# Patient Record
Sex: FEMALE | Race: BLACK OR AFRICAN AMERICAN | NOT HISPANIC OR LATINO | Employment: PART TIME | URBAN - METROPOLITAN AREA
[De-identification: names, ages, dates, MRNs, and addresses within clinical notes are randomized per-mention and may not be internally consistent; named-entity substitution may affect disease eponyms.]

---

## 2017-01-02 ENCOUNTER — HOSPITAL ENCOUNTER (OUTPATIENT)
Dept: OBSTETRICS AND GYNECOLOGY | Facility: HOSPITAL | Age: 27
End: 2017-01-02
Attending: OBSTETRICS & GYNECOLOGY | Admitting: OBSTETRICS & GYNECOLOGY

## 2017-02-16 ENCOUNTER — HISTORICAL (OUTPATIENT)
Dept: LAB | Facility: HOSPITAL | Age: 27
End: 2017-02-16

## 2019-06-12 ENCOUNTER — HISTORICAL (OUTPATIENT)
Dept: LAB | Facility: HOSPITAL | Age: 29
End: 2019-06-12

## 2019-08-05 ENCOUNTER — HISTORICAL (OUTPATIENT)
Dept: LAB | Facility: HOSPITAL | Age: 29
End: 2019-08-05

## 2019-10-01 ENCOUNTER — HISTORICAL (OUTPATIENT)
Dept: ADMINISTRATIVE | Facility: HOSPITAL | Age: 29
End: 2019-10-01

## 2019-10-02 ENCOUNTER — HISTORICAL (OUTPATIENT)
Dept: ADMINISTRATIVE | Facility: HOSPITAL | Age: 29
End: 2019-10-02

## 2019-12-23 ENCOUNTER — HISTORICAL (OUTPATIENT)
Dept: LAB | Facility: HOSPITAL | Age: 29
End: 2019-12-23

## 2019-12-24 ENCOUNTER — HISTORICAL (OUTPATIENT)
Dept: ADMINISTRATIVE | Facility: HOSPITAL | Age: 29
End: 2019-12-24

## 2019-12-25 LAB — FINAL CULTURE: NORMAL

## 2022-04-09 ENCOUNTER — HISTORICAL (OUTPATIENT)
Dept: ADMINISTRATIVE | Facility: HOSPITAL | Age: 32
End: 2022-04-09

## 2022-04-26 VITALS
SYSTOLIC BLOOD PRESSURE: 128 MMHG | HEIGHT: 72 IN | DIASTOLIC BLOOD PRESSURE: 70 MMHG | BODY MASS INDEX: 33.86 KG/M2 | WEIGHT: 250 LBS

## 2022-06-15 ENCOUNTER — LAB VISIT (OUTPATIENT)
Dept: LAB | Facility: HOSPITAL | Age: 32
End: 2022-06-15
Attending: OBSTETRICS & GYNECOLOGY
Payer: MEDICAID

## 2022-06-15 DIAGNOSIS — N91.2 AMENORRHEA: ICD-10-CM

## 2022-06-15 LAB — B-HCG FREE SERPL-ACNC: ABNORMAL MIU/ML

## 2022-06-15 PROCEDURE — 36415 COLL VENOUS BLD VENIPUNCTURE: CPT

## 2022-06-15 PROCEDURE — 81025 URINE PREGNANCY TEST: CPT

## 2022-06-27 ENCOUNTER — LAB VISIT (OUTPATIENT)
Dept: LAB | Facility: HOSPITAL | Age: 32
End: 2022-06-27
Attending: OBSTETRICS & GYNECOLOGY
Payer: MEDICAID

## 2022-06-27 DIAGNOSIS — O20.0 THREATENED MISCARRIAGE IN EARLY PREGNANCY: ICD-10-CM

## 2022-06-27 LAB — B-HCG FREE SERPL-ACNC: ABNORMAL MIU/ML

## 2022-06-27 PROCEDURE — 81025 URINE PREGNANCY TEST: CPT

## 2022-06-27 PROCEDURE — 36415 COLL VENOUS BLD VENIPUNCTURE: CPT

## 2022-06-28 ENCOUNTER — HOSPITAL ENCOUNTER (OUTPATIENT)
Dept: RADIOLOGY | Facility: HOSPITAL | Age: 32
Discharge: HOME | End: 2022-06-28
Attending: OBSTETRICS & GYNECOLOGY
Payer: MEDICAID

## 2022-06-28 DIAGNOSIS — N91.2 AMENORRHEA: ICD-10-CM

## 2022-06-28 DIAGNOSIS — Z34.80 PREGNANCY IN MULTIGRAVIDA: ICD-10-CM

## 2022-06-28 PROCEDURE — 76801 OB US < 14 WKS SINGLE FETUS: CPT | Mod: TC

## 2022-07-11 ENCOUNTER — HOSPITAL ENCOUNTER (EMERGENCY)
Facility: HOSPITAL | Age: 32
Discharge: HOME | End: 2022-07-11
Attending: EMERGENCY MEDICINE
Payer: MEDICAID

## 2022-07-11 VITALS
BODY MASS INDEX: 33.23 KG/M2 | HEIGHT: 72 IN | HEART RATE: 83 BPM | RESPIRATION RATE: 18 BRPM | DIASTOLIC BLOOD PRESSURE: 71 MMHG | OXYGEN SATURATION: 99 % | TEMPERATURE: 99 F | WEIGHT: 245.38 LBS | SYSTOLIC BLOOD PRESSURE: 143 MMHG

## 2022-07-11 DIAGNOSIS — O03.9 MISCARRIAGE: Primary | ICD-10-CM

## 2022-07-11 LAB
ALBUMIN SERPL-MCNC: 3.7 GM/DL (ref 3.5–5)
ALBUMIN/GLOB SERPL: 1 RATIO (ref 1.1–2)
ALP SERPL-CCNC: 70 UNIT/L (ref 40–150)
ALT SERPL-CCNC: 11 UNIT/L (ref 0–55)
APPEARANCE UR: ABNORMAL
AST SERPL-CCNC: 16 UNIT/L (ref 5–34)
B-HCG FREE SERPL-ACNC: 7238.2 MIU/ML
BACTERIA #/AREA URNS AUTO: ABNORMAL /HPF
BASOPHILS # BLD AUTO: 0.03 X10(3)/MCL (ref 0–0.2)
BASOPHILS NFR BLD AUTO: 0.5 %
BILIRUB UR QL STRIP.AUTO: ABNORMAL
BILIRUBIN DIRECT+TOT PNL SERPL-MCNC: 0.5 MG/DL
BUN SERPL-MCNC: 7.2 MG/DL (ref 7–18.7)
CALCIUM SERPL-MCNC: 9.2 MG/DL (ref 8.4–10.2)
CHLORIDE SERPL-SCNC: 106 MMOL/L (ref 98–107)
CO2 SERPL-SCNC: 21 MMOL/L (ref 22–29)
COLOR UR AUTO: ABNORMAL
CREAT SERPL-MCNC: 0.68 MG/DL (ref 0.55–1.02)
EOSINOPHIL # BLD AUTO: 0.15 X10(3)/MCL (ref 0–0.9)
EOSINOPHIL NFR BLD AUTO: 2.3 %
ERYTHROCYTE [DISTWIDTH] IN BLOOD BY AUTOMATED COUNT: 13.6 % (ref 11.5–17)
GLOBULIN SER-MCNC: 3.7 GM/DL (ref 2.4–3.5)
GLUCOSE SERPL-MCNC: 113 MG/DL (ref 74–100)
GLUCOSE UR QL STRIP.AUTO: ABNORMAL
GROUP & RH: NORMAL
HCT VFR BLD AUTO: 36.2 % (ref 37–47)
HGB BLD-MCNC: 11.8 GM/DL (ref 12–16)
IMM GRANULOCYTES # BLD AUTO: 0.02 X10(3)/MCL (ref 0–0.04)
IMM GRANULOCYTES NFR BLD AUTO: 0.3 %
KETONES UR QL STRIP.AUTO: ABNORMAL
LEUKOCYTE ESTERASE UR QL STRIP.AUTO: ABNORMAL
LYMPHOCYTES # BLD AUTO: 1.73 X10(3)/MCL (ref 0.6–4.6)
LYMPHOCYTES NFR BLD AUTO: 26.3 %
MCH RBC QN AUTO: 27.1 PG (ref 27–31)
MCHC RBC AUTO-ENTMCNC: 32.6 MG/DL (ref 33–36)
MCV RBC AUTO: 83 FL (ref 80–94)
MONOCYTES # BLD AUTO: 0.52 X10(3)/MCL (ref 0.1–1.3)
MONOCYTES NFR BLD AUTO: 7.9 %
NEUTROPHILS # BLD AUTO: 4.1 X10(3)/MCL (ref 2.1–9.2)
NEUTROPHILS NFR BLD AUTO: 62.7 %
NITRITE UR QL STRIP.AUTO: ABNORMAL
NRBC BLD AUTO-RTO: 0 %
PH UR STRIP.AUTO: ABNORMAL [PH]
PLATELET # BLD AUTO: 323 X10(3)/MCL (ref 130–400)
PMV BLD AUTO: 9.3 FL (ref 7.4–10.4)
POTASSIUM SERPL-SCNC: 3.8 MMOL/L (ref 3.5–5.1)
PROT SERPL-MCNC: 7.4 GM/DL (ref 6.4–8.3)
PROT UR QL STRIP.AUTO: ABNORMAL
RBC # BLD AUTO: 4.36 X10(6)/MCL (ref 4.2–5.4)
RBC #/AREA URNS AUTO: >=100 /HPF
RBC UR QL AUTO: ABNORMAL
SODIUM SERPL-SCNC: 136 MMOL/L (ref 136–145)
SP GR UR STRIP.AUTO: ABNORMAL
SQUAMOUS #/AREA URNS AUTO: ABNORMAL /HPF
UROBILINOGEN UR STRIP-ACNC: ABNORMAL
WBC # SPEC AUTO: 6.6 X10(3)/MCL (ref 4.5–11.5)
WBC #/AREA URNS AUTO: ABNORMAL /HPF

## 2022-07-11 PROCEDURE — 36415 COLL VENOUS BLD VENIPUNCTURE: CPT | Performed by: EMERGENCY MEDICINE

## 2022-07-11 PROCEDURE — 81025 URINE PREGNANCY TEST: CPT | Performed by: EMERGENCY MEDICINE

## 2022-07-11 PROCEDURE — 86901 BLOOD TYPING SEROLOGIC RH(D): CPT | Performed by: EMERGENCY MEDICINE

## 2022-07-11 PROCEDURE — 80053 COMPREHEN METABOLIC PANEL: CPT | Performed by: EMERGENCY MEDICINE

## 2022-07-11 PROCEDURE — 85025 COMPLETE CBC W/AUTO DIFF WBC: CPT | Performed by: EMERGENCY MEDICINE

## 2022-07-11 PROCEDURE — 81001 URINALYSIS AUTO W/SCOPE: CPT | Performed by: EMERGENCY MEDICINE

## 2022-07-11 PROCEDURE — 99284 EMERGENCY DEPT VISIT MOD MDM: CPT | Mod: 25

## 2022-07-11 RX ORDER — METHYLERGONOVINE MALEATE 0.2 MG/1
0.2 TABLET ORAL 4 TIMES DAILY
Qty: 8 TABLET | Refills: 0 | Status: SHIPPED | OUTPATIENT
Start: 2022-07-11 | End: 2022-07-13

## 2022-07-11 NOTE — ED PROVIDER NOTES
Encounter Date: 7/11/2022    SCRIBE #1 NOTE: I, Vikki Hernandez, am scribing for, and in the presence of,  Sriram Cornejo. I have scribed the following portions of the note - Other sections scribed: HPI, ROS, physical exam.       History     Chief Complaint   Patient presents with    Vaginal Bleeding     C/o vaginal bleeding and lower abdominal pain starting this morning. Pt states passing clots. Pt states 7 weeks pregnant. Denies N/V.      32 y/o female presents to the ED with complaints of abdominal cramping and vaginal bleeding onset 3 hours ago. Pt reports that she is 7 weeks pregnant and that this is her sixth pregnancy with one miscarriage in the past. She notes that there is lots of clotting.     OBGYN: Gentry Tan MD      Vaginal Bleeding  This is a new problem. The current episode started 3 to 5 hours ago. The problem occurs rarely. The problem has not changed since onset.Associated symptoms include abdominal pain. Pertinent negatives include no chest pain and no shortness of breath. Nothing aggravates the symptoms. Nothing relieves the symptoms. She has tried nothing for the symptoms.     Review of patient's allergies indicates:   Allergen Reactions    Amoxicillin Hives     History reviewed. No pertinent past medical history.  History reviewed. No pertinent surgical history.  Family History   Problem Relation Age of Onset    Hypertension Mother     Diabetes Mother     Cataracts Mother     Colon cancer Mother      Social History     Tobacco Use    Smoking status: Never Smoker    Smokeless tobacco: Never Used     Review of Systems   Constitutional: Negative for fatigue, fever and unexpected weight change.   HENT: Negative for congestion and rhinorrhea.    Eyes: Negative for pain.   Respiratory: Negative for chest tightness, shortness of breath and wheezing.    Cardiovascular: Negative for chest pain.   Gastrointestinal: Positive for abdominal pain. Negative for constipation, diarrhea, nausea  and vomiting.   Genitourinary: Positive for vaginal bleeding. Negative for dysuria.   Musculoskeletal: Negative for back pain and neck pain.   Skin: Negative for rash.   Allergic/Immunologic: Negative for environmental allergies, food allergies and immunocompromised state.   Neurological: Negative for dizziness and speech difficulty.   Hematological: Does not bruise/bleed easily.   Psychiatric/Behavioral: Negative for sleep disturbance and suicidal ideas.       Physical Exam     Initial Vitals [07/11/22 0518]   BP Pulse Resp Temp SpO2   (!) 150/88 99 20 98.8 °F (37.1 °C) 100 %      MAP       --         Physical Exam    Nursing note and vitals reviewed.  Constitutional: She appears well-developed and well-nourished.   HENT:   Head: Normocephalic and atraumatic.   Right Ear: External ear normal.   Left Ear: External ear normal.   Eyes: Conjunctivae and EOM are normal. Pupils are equal, round, and reactive to light.   Neck: Neck supple.   Normal range of motion.  Cardiovascular: Normal rate, regular rhythm, normal heart sounds and intact distal pulses.   Pulmonary/Chest: Breath sounds normal.   Abdominal: Abdomen is soft. Bowel sounds are normal.   Musculoskeletal:         General: Normal range of motion.      Cervical back: Normal range of motion and neck supple.     Neurological: She is alert and oriented to person, place, and time. GCS score is 15. GCS eye subscore is 4. GCS verbal subscore is 5. GCS motor subscore is 6.   Skin: Skin is warm and dry. Capillary refill takes less than 2 seconds.   Psychiatric: She has a normal mood and affect. Her behavior is normal. Judgment and thought content normal.         ED Course   Procedures  Labs Reviewed   URINALYSIS, REFLEX TO URINE CULTURE - Abnormal; Notable for the following components:       Result Value    Color, UA Red (*)     Appearance, UA Bloody (*)     All other components within normal limits    Narrative:     Unable to perform biochemical testing on urine due  to color.    HCG, QUANTITATIVE - Abnormal; Notable for the following components:    Beta Human Chorionic Gonadotropin Quantitative 7,238.20 (*)     All other components within normal limits   COMPREHENSIVE METABOLIC PANEL - Abnormal; Notable for the following components:    Carbon Dioxide 21 (*)     Glucose Level 113 (*)     Globulin 3.7 (*)     Albumin/Globulin Ratio 1.0 (*)     All other components within normal limits   CBC WITH DIFFERENTIAL - Abnormal; Notable for the following components:    Hgb 11.8 (*)     Hct 36.2 (*)     MCHC 32.6 (*)     All other components within normal limits   URINALYSIS, MICROSCOPIC - Abnormal; Notable for the following components:    RBC, UA >=100 (*)     All other components within normal limits   CBC W/ AUTO DIFFERENTIAL    Narrative:     The following orders were created for panel order CBC auto differential.  Procedure                               Abnormality         Status                     ---------                               -----------         ------                     CBC with Differential[[id number]]        Abnormal            Final result                 Please view results for these tests on the individual orders.   GROUP & RH          Imaging Results          US OB <14 Wks, TransAbd, Single Gestation (Final result)  Result time 07/11/22 08:51:32    Final result by Nani Hogan MD (07/11/22 08:51:32)                 Impression:      1. No intrauterine pregnancy identified.  2. Vascular material within the endometrial canal may represent retained products of conception.      Electronically signed by: Nani Hogan  Date:    07/11/2022  Time:    08:51             Narrative:    EXAMINATION:  US OB <14 WEEKS TRANSABDOM, SINGLE GESTATION    CLINICAL HISTORY:  vaginal bleeding;    TECHNIQUE:  Transabdominal ultrasound of the pelvis.    COMPARISON:  Ultrasound dated 06/28/2022    FINDINGS:  The uterus measures 12.1 x 6.1 x 6.4 cm in size.  The endometrium  measures 3 mm in thickness.  There is no intrauterine pregnancy identified.  Material seen within the endometrial canal measures 3 x 1.8 cm in size with internal vascularity.    The right ovary is not visualized.  Left ovary measures 3 x 2.6 x 2.1 cm in size.    There is no adnexal mass or free fluid.                                 Medications - No data to display             Attending Attestation:           Physician Attestation for Scribe:  Physician Attestation Statement for Scribe #1: I, Sriram Cornejo, reviewed documentation, as scribed by Vikki Hernandez in my presence, and it is both accurate and complete.                      Clinical Impression:   Final diagnoses:  [O03.9] Miscarriage (Primary)          ED Disposition Condition    Discharge Stable        ED Prescriptions     Medication Sig Dispense Start Date End Date Auth. Provider    methylergonovine (METHERGINE) 0.2 mg tablet Take 1 tablet (0.2 mg total) by mouth 4 (four) times daily. for 2 days 8 tablet 7/11/2022 7/13/2022 Sriram Cornejo MD        Follow-up Information     Follow up With Specialties Details Why Contact Info    Follow up with your primary MD in 3-5 days if not improved.  Return to ED for worsening symptoms.               Sriram Cornejo MD  07/11/22 0901

## 2022-07-11 NOTE — Clinical Note
"Anna Russell" Abdirashid was seen and treated in our emergency department on 7/11/2022.  She may return to work on 07/14/2022.       If you have any questions or concerns, please don't hesitate to call.       RN    "

## 2022-07-21 ENCOUNTER — LAB VISIT (OUTPATIENT)
Dept: LAB | Facility: HOSPITAL | Age: 32
End: 2022-07-21
Attending: OBSTETRICS & GYNECOLOGY
Payer: MEDICAID

## 2022-07-21 DIAGNOSIS — O03.4 INCOMPLETE ABORTION: ICD-10-CM

## 2022-07-21 LAB — B-HCG FREE SERPL-ACNC: 66.34 MIU/ML

## 2022-07-21 PROCEDURE — 36415 COLL VENOUS BLD VENIPUNCTURE: CPT

## 2022-07-21 PROCEDURE — 84702 CHORIONIC GONADOTROPIN TEST: CPT

## 2022-12-09 ENCOUNTER — LAB VISIT (OUTPATIENT)
Dept: LAB | Facility: HOSPITAL | Age: 32
End: 2022-12-09
Attending: OBSTETRICS & GYNECOLOGY
Payer: MEDICAID

## 2022-12-09 DIAGNOSIS — Z03.89 ENCNTR FOR OBS FOR OTH SUSPECTED DISEASES AND COND RULED OUT: ICD-10-CM

## 2022-12-09 DIAGNOSIS — Z34.81 PRENATAL CARE, SUBSEQUENT PREGNANCY, FIRST TRIMESTER: ICD-10-CM

## 2022-12-09 DIAGNOSIS — N91.2 AMENORRHEA: ICD-10-CM

## 2022-12-09 LAB
AMPHET UR QL SCN: NEGATIVE
BARBITURATE SCN PRESENT UR: NEGATIVE
BASOPHILS # BLD AUTO: 0.01 X10(3)/MCL (ref 0–0.2)
BASOPHILS NFR BLD AUTO: 0.2 %
BENZODIAZ UR QL SCN: NEGATIVE
CANNABINOIDS UR QL SCN: NEGATIVE
COCAINE UR QL SCN: NEGATIVE
EOSINOPHIL # BLD AUTO: 0.07 X10(3)/MCL (ref 0–0.9)
EOSINOPHIL NFR BLD AUTO: 1.1 %
ERYTHROCYTE [DISTWIDTH] IN BLOOD BY AUTOMATED COUNT: 15.3 % (ref 11.5–17)
GROUP & RH: NORMAL
HBV SURFACE AG SERPL QL IA: NONREACTIVE
HCT VFR BLD AUTO: 38.4 % (ref 37–47)
HCV AB SERPL QL IA: NONREACTIVE
HGB BLD-MCNC: 11.7 GM/DL (ref 12–16)
HIV 1+2 AB+HIV1 P24 AG SERPL QL IA: NONREACTIVE
IMM GRANULOCYTES # BLD AUTO: 0.01 X10(3)/MCL (ref 0–0.04)
IMM GRANULOCYTES NFR BLD AUTO: 0.2 %
INDIRECT COOMBS GEL: NORMAL
LYMPHOCYTES # BLD AUTO: 1.62 X10(3)/MCL (ref 0.6–4.6)
LYMPHOCYTES NFR BLD AUTO: 24.8 %
MCH RBC QN AUTO: 24.7 PG (ref 27–31)
MCHC RBC AUTO-ENTMCNC: 30.5 MG/DL (ref 33–36)
MCV RBC AUTO: 81 FL (ref 80–94)
MONOCYTES # BLD AUTO: 0.4 X10(3)/MCL (ref 0.1–1.3)
MONOCYTES NFR BLD AUTO: 6.1 %
NEUTROPHILS # BLD AUTO: 4.4 X10(3)/MCL (ref 2.1–9.2)
NEUTROPHILS NFR BLD AUTO: 67.6 %
OPIATES UR QL SCN: NEGATIVE
PCP UR QL: NEGATIVE
PH UR: 7 [PH] (ref 3–11)
PLATELET # BLD AUTO: 342 X10(3)/MCL (ref 130–400)
PMV BLD AUTO: 9.1 FL (ref 7.4–10.4)
RBC # BLD AUTO: 4.74 X10(6)/MCL (ref 4.2–5.4)
RUBELLA IMMUNE STATUS: NORMAL
SPECIFIC GRAVITY, URINE AUTO (.000) (OHS): 1.02 (ref 1–1.03)
T PALLIDUM AB SER QL: NONREACTIVE
T4 FREE SERPL-MCNC: 0.8 NG/DL (ref 0.7–1.48)
TSH SERPL-ACNC: 1.56 UIU/ML (ref 0.35–4.94)
WBC # SPEC AUTO: 6.5 X10(3)/MCL (ref 4.5–11.5)

## 2022-12-09 PROCEDURE — 85025 COMPLETE CBC W/AUTO DIFF WBC: CPT

## 2022-12-09 PROCEDURE — 87340 HEPATITIS B SURFACE AG IA: CPT

## 2022-12-09 PROCEDURE — 86850 RBC ANTIBODY SCREEN: CPT | Performed by: OBSTETRICS & GYNECOLOGY

## 2022-12-09 PROCEDURE — 84443 ASSAY THYROID STIM HORMONE: CPT

## 2022-12-09 PROCEDURE — 86787 VARICELLA-ZOSTER ANTIBODY: CPT

## 2022-12-09 PROCEDURE — 84439 ASSAY OF FREE THYROXINE: CPT

## 2022-12-09 PROCEDURE — 86780 TREPONEMA PALLIDUM: CPT

## 2022-12-09 PROCEDURE — 36415 COLL VENOUS BLD VENIPUNCTURE: CPT

## 2022-12-09 PROCEDURE — 87389 HIV-1 AG W/HIV-1&-2 AB AG IA: CPT

## 2022-12-09 PROCEDURE — 83021 HEMOGLOBIN CHROMOTOGRAPHY: CPT

## 2022-12-09 PROCEDURE — 82664 ELECTROPHORETIC TEST: CPT

## 2022-12-09 PROCEDURE — 86803 HEPATITIS C AB TEST: CPT

## 2022-12-09 PROCEDURE — 86762 RUBELLA ANTIBODY: CPT

## 2022-12-09 PROCEDURE — 80307 DRUG TEST PRSMV CHEM ANLYZR: CPT

## 2022-12-10 LAB
RUBV IGG SERPL IA-ACNC: 1
RUBV IGG SERPL QL IA: POSITIVE
VZV IGG SER IA-ACNC: 1
VZV IGG SER QL IA: NORMAL

## 2022-12-12 LAB
HGB A MFR BLD ELPH: 97.4 % (ref 95.8–98)
HGB A2 MFR BLD: 2.6 % (ref 2–3.3)
HGB F MFR BLD: 0 % (ref 0–0.9)
HGB FRACT BLD ELPH-IMP: NORMAL
M HPLC HB VARIANT, B: NORMAL

## 2023-03-03 ENCOUNTER — LAB VISIT (OUTPATIENT)
Dept: LAB | Facility: HOSPITAL | Age: 33
End: 2023-03-03
Attending: OBSTETRICS & GYNECOLOGY
Payer: MEDICAID

## 2023-03-03 DIAGNOSIS — Z34.82 SUPERVISION OF NORMAL INTRAUTERINE PREGNANCY IN MULTIGRAVIDA, SECOND TRIMESTER: ICD-10-CM

## 2023-03-03 PROCEDURE — 36415 COLL VENOUS BLD VENIPUNCTURE: CPT

## 2023-03-03 PROCEDURE — 81511 FTL CGEN ABNOR FOUR ANAL: CPT

## 2023-03-06 LAB
# FETUSES: NORMAL
2ND TRIMESTER 4 SCREEN SERPL-IMP: NORMAL
AFP ADJ MOM SERPL: 0.67 MOM
AFP SERPL IA-MCNC: 28.9 NG/ML
AGE AT DELIVERY: NORMAL
B-HCG ADJ MOM SERPL: 1.21 MOM
CHORION TYPE: NORMAL
COLLECT DATE: NORMAL
CURRENT SMOKER: NORMAL
FET TS 21 RISK FROM MAT AGE: NORMAL
GA EST FROM LMP: NORMAL WK,D
GA METHOD: NORMAL
HCG SERPL IA-ACNC: 20.4 IU/ML
HX OF NTD QL: NO
HX OF NTD QL: NO
HX OF TRISOMY 21 QL: NO
IDDM PATIENT QL: NO
INHIBIN A ADJ MOM SERPL: 1.06 MOM
INHIBIN SERPL-MCNC: 118 PG/ML
IVF PREGNANCY: NO
LABORATORY COMMENT REPORT: NORMAL
M PHYSICIAN PHONE NUMBER: NORMAL
MATERNAL RISK FACTORS: NORMAL
NEURAL TUBE DEFECT RISK FETUS: NORMAL %
RECOM F/U: NORMAL
TEST PERFORMANCE INFO SPEC: NORMAL
TS 18 RISK FETUS: NORMAL
TS 21 RISK FETUS: NORMAL
U ESTRIOL ADJ MOM SERPL: 0.66 MOM
U ESTRIOL SERPL-MCNC: 1.05 NG/ML

## 2023-03-10 ENCOUNTER — LAB VISIT (OUTPATIENT)
Dept: LAB | Facility: HOSPITAL | Age: 33
End: 2023-03-10
Attending: OBSTETRICS & GYNECOLOGY
Payer: MEDICAID

## 2023-03-10 DIAGNOSIS — O09.292 HX OF MATERNAL LACERATION, 3RD DEGREE, CURRENTLY PREGNANT, SECOND TRIMESTER: ICD-10-CM

## 2023-03-10 DIAGNOSIS — Z3A.19 19 WEEKS GESTATION OF PREGNANCY: Primary | ICD-10-CM

## 2023-03-10 DIAGNOSIS — O09.92 SUPERVISION OF HIGH RISK PREGNANCY IN SECOND TRIMESTER: ICD-10-CM

## 2023-03-10 DIAGNOSIS — O10.012 PRE-EXISTING ESSENTIAL HYPERTENSION COMPLICATING PREGNANCY IN SECOND TRIMESTER: ICD-10-CM

## 2023-03-10 DIAGNOSIS — O99.212 OBESITY COMPLICATING PREGNANCY IN SECOND TRIMESTER: ICD-10-CM

## 2023-03-10 DIAGNOSIS — O09.212 SUPERVISION OF PREGNANCY WITH HISTORY OF PRE-TERM LABOR IN SECOND TRIMESTER: ICD-10-CM

## 2023-03-10 LAB
CREAT CL 24H UR+SERPL-VRATE: 132.3 ML/MIN (ref 70–157)
CREAT SERPL-MCNC: 0.66 MG/DL (ref 0.55–1.02)
CREAT SERPL-MCNC: 0.66 MG/DL (ref 0.55–1.02)
CREAT UR-MCNC: 49.3 MG/DL (ref 47–110)
GFR SERPLBLD CREATININE-BSD FMLA CKD-EPI: >60 MLS/MIN/1.73/M2
GLUCOSE 1H P 100 G GLC PO SERPL-MCNC: 227 MG/DL (ref 74–100)
HRS COLLECTED CREATININE (OHS): 24 HR
PROT 24H UR-MCNC: NORMAL G/DL
PROT UR STRIP-MCNC: <6.8 MG/DL
TOTAL VOLUME  (OHS): 2550 ML
TOTAL VOLUME  (OHS): 2550 ML

## 2023-03-10 PROCEDURE — 82565 ASSAY OF CREATININE: CPT

## 2023-03-10 PROCEDURE — 82950 GLUCOSE TEST: CPT

## 2023-03-10 PROCEDURE — 36415 COLL VENOUS BLD VENIPUNCTURE: CPT

## 2023-03-10 PROCEDURE — 84156 ASSAY OF PROTEIN URINE: CPT

## 2023-05-25 DIAGNOSIS — O10.919 HTN IN PREGNANCY, CHRONIC: ICD-10-CM

## 2023-05-25 DIAGNOSIS — O09.893 H/O PRETERM DELIVERY, CURRENTLY PREGNANT, THIRD TRIMESTER: ICD-10-CM

## 2023-05-25 DIAGNOSIS — O24.414 INSULIN CONTROLLED GESTATIONAL DIABETES MELLITUS (GDM) IN THIRD TRIMESTER: ICD-10-CM

## 2023-05-25 NOTE — PROGRESS NOTES
23    32-year-old female  at 30 2/7 weeks gestation with EDC of 2023 by early ultrasound and unknown LMP. Her chronic medical, obstetrical or fetal conditions that affect the pregnancy include the following: Patient has history of PTD at 36 weeks in 2017 pregnancy, confirmed by delivery record. She is on weekly Mayetta and had cervical insufficiency ruled out. She decided to continue Mayetta as long as supplies last, after counseling after FDA position on Lisa. She had elevated BMI greater than 35 on consult visit. She has recurrent gestational diabetes. She is currently on 52 units NPH at bedtime. She brought log for review.She has mild underlying CHTN. She is on low dose aspirin twice daily.On 3-10-23 24 hour urine protein unable to calculate, creatinine 0.66. She is grandmultiparous, denied any issues with heavy postpartum bleeding or requiring blood transfusion. She reports good fetal movement. She voices no complaints and denies any abdominal cramping, leaking of fluid, vaginal bleeding or vaginal discharge. Patient also reports no headaches, visual problems or epigastric pain.  Diagnosis & Plan  1) Z3A.30 Weeks of gestation; 30 weeks gestation of pregnancy  2) O24.414 Diabetes mellitus in pregnancy, childbirth, and the puerperium; Gestational diabetes mellitus in pregnancy, insulin controlled  3) O10.013 Pre-existing hypertension complicating pregnancy, childbirth and the puerperium; Pre-existing essential hypertension complicating pregnancy, third trimester  4) O09.213 Supervision of high risk pregnancy; Supervision of pregnancy with history of pre-term labor, third trimester  5) E66.01 Overweight and obesity; Morbid (severe) obesity due to excess calories  General Plan  30 2/7 weeks gestation with    1. Previous  delivery, with upper normal fetal growth (1269 g at 69%, AC at 83% on 23), with normal FHR per Doppler    She has decided to continue Mayetta until she runs out or  until 36 weeks, and she has elected to avoid vaginal progesterone if she runs out of Lisa before 36 weeks.    Received Lisa injection today and will continue weekly until 36 weeks or until out of medication.  PTL precautions reviewed.    2. Mild underlying CHTN with report of elevated BP late last pregnancy  Chronic hypertension complicates up to 2-5% of pregnancies and is associated with significant adverse pregnancy outcomes including  birth, fetal growth restriction, fetal demise, placental abruption, and  delivery.    With blood pressure 129/71, she was advised to continue a low sodium diet avoiding any excessive weight gain. On 3-10-23 24 hour urine protein unable to calculate, creatinine 0.66.    Continue low dose aspirin 81mg po twice daily until 34 6/7 weeks, then adjust to once daily dosing after that until delivery. Preeclampsia precautions reviewed.    Will do follow-up ultrasounds to monitor growth every 3-4 weeks with TWICE WEEKLY fetal testing starting around 32 weeks gestation until the end of pregnancy to alternate with primary OB with added risk of GDM on insulin. Reviewed FKC 3x/day and prn with instructions to report any decreased fetal movement.    Among patients with uncomplicated chronic hypertension with no additional risk factors, delivery at 38-39 weeks appears to provide optimal balance between the risk of adverse fetal and  outcomes. However, will reassess closer to EDC to determine optimal timeframe or GA for delivery, based on current evaluation at that time.    3. Elevated BMI greater than 35, stable weight  Continue low calorie, low fat, low Na, diabetic diet avoiding any additional excessive weight gain. Excess weight gain would be associated with worsening hypertension, worsening gestational diabetes and adverse  outcomes, including fetal demise in utero.    4. Grandmultiparity  Consider using prophylactic dose of uterotonic agents after delivery  to reduce the risk of postpartum hemorrhage.    5. Recurrent gestational diabetes.  She is aware of risks associated with diabetes in pregnancy including higher risk for polyhydramnios, fetal macrosomia, lower Apgar scores and  metabolic complications (hypoglycemia, hyperbilirubinemia, hypocalcemia, erythema) and developing preeclampsia.    Continue 2200 calorie ADA diabetic diet. It is recommended that she have 30 grams of carbohydrates with breakfast, and 60 grams with lunch and dinner. In addition, she should have three snacks in between meals with 15 grams of carbohydrates and at bedtime with 30 grams of carbohydrates.    Log reviewed. Will continue 52 units NPH at bedtime. Continue to check blood glucose, and continue checking 2/day (fasting and alternate post meal checks), and call weekly with values.    The association of gestational diabetes (50%) with adult-onset type 2 diabetes mellitus was previously discussed. The importance of losing weight after the pregnancy was also previously discussed, as well as doing a 75 g sugar test after postpartum exam and to have annual checkups every 1-3 years for blood sugars to make sure she does not develop diabetes.  Return Visit:- 3 Weeks    -Reason:- MFM Follow Up BPP    next week injection with Dr Tan.

## 2023-06-01 ENCOUNTER — OFFICE VISIT (OUTPATIENT)
Dept: MATERNAL FETAL MEDICINE | Facility: CLINIC | Age: 33
End: 2023-06-01
Payer: MEDICAID

## 2023-06-01 ENCOUNTER — PROCEDURE VISIT (OUTPATIENT)
Dept: MATERNAL FETAL MEDICINE | Facility: CLINIC | Age: 33
End: 2023-06-01
Payer: MEDICAID

## 2023-06-01 VITALS
SYSTOLIC BLOOD PRESSURE: 110 MMHG | HEIGHT: 72 IN | BODY MASS INDEX: 35.08 KG/M2 | WEIGHT: 259 LBS | DIASTOLIC BLOOD PRESSURE: 70 MMHG

## 2023-06-01 DIAGNOSIS — O24.414 INSULIN CONTROLLED GESTATIONAL DIABETES MELLITUS (GDM) IN THIRD TRIMESTER: Primary | ICD-10-CM

## 2023-06-01 DIAGNOSIS — O09.893 H/O PRETERM DELIVERY, CURRENTLY PREGNANT, THIRD TRIMESTER: ICD-10-CM

## 2023-06-01 DIAGNOSIS — O99.213 OBESITY COMPLICATING PREGNANCY IN THIRD TRIMESTER: ICD-10-CM

## 2023-06-01 DIAGNOSIS — O24.414 INSULIN CONTROLLED GESTATIONAL DIABETES MELLITUS (GDM) IN THIRD TRIMESTER: ICD-10-CM

## 2023-06-01 DIAGNOSIS — O10.919 HTN IN PREGNANCY, CHRONIC: ICD-10-CM

## 2023-06-01 PROBLEM — O10.013 PRE-EXISTING ESSENTIAL HYPERTENSION COMPLICATING PREGNANCY IN THIRD TRIMESTER: Status: ACTIVE | Noted: 2023-05-25

## 2023-06-01 LAB — GLUCOSE SERPL-MCNC: 96 MG/DL (ref 70–110)

## 2023-06-01 PROCEDURE — 99214 OFFICE O/P EST MOD 30 MIN: CPT | Mod: TH,S$GLB,, | Performed by: OBSTETRICS & GYNECOLOGY

## 2023-06-01 PROCEDURE — 3008F BODY MASS INDEX DOCD: CPT | Mod: CPTII,S$GLB,, | Performed by: OBSTETRICS & GYNECOLOGY

## 2023-06-01 PROCEDURE — 99214 PR OFFICE/OUTPT VISIT, EST, LEVL IV, 30-39 MIN: ICD-10-PCS | Mod: TH,S$GLB,, | Performed by: OBSTETRICS & GYNECOLOGY

## 2023-06-01 PROCEDURE — 3074F SYST BP LT 130 MM HG: CPT | Mod: CPTII,S$GLB,, | Performed by: OBSTETRICS & GYNECOLOGY

## 2023-06-01 PROCEDURE — 76819 FETAL BIOPHYS PROFIL W/O NST: CPT | Mod: 59,95,S$GLB, | Performed by: OBSTETRICS & GYNECOLOGY

## 2023-06-01 PROCEDURE — 3078F PR MOST RECENT DIASTOLIC BLOOD PRESSURE < 80 MM HG: ICD-10-PCS | Mod: CPTII,S$GLB,, | Performed by: OBSTETRICS & GYNECOLOGY

## 2023-06-01 PROCEDURE — 82962 POCT GLUCOSE, HAND-HELD DEVICE: ICD-10-PCS | Mod: ,,, | Performed by: OBSTETRICS & GYNECOLOGY

## 2023-06-01 PROCEDURE — 76816 OB US FOLLOW-UP PER FETUS: CPT | Mod: S$GLB,,, | Performed by: OBSTETRICS & GYNECOLOGY

## 2023-06-01 PROCEDURE — 3074F PR MOST RECENT SYSTOLIC BLOOD PRESSURE < 130 MM HG: ICD-10-PCS | Mod: CPTII,S$GLB,, | Performed by: OBSTETRICS & GYNECOLOGY

## 2023-06-01 PROCEDURE — 76819 PR US, OB, FETAL BIOPHYSICAL, W/O NST: ICD-10-PCS | Mod: 59,95,S$GLB, | Performed by: OBSTETRICS & GYNECOLOGY

## 2023-06-01 PROCEDURE — 76816 PR  US,PREGNANT UTERUS,F/U,TRANSABD APP: ICD-10-PCS | Mod: S$GLB,,, | Performed by: OBSTETRICS & GYNECOLOGY

## 2023-06-01 PROCEDURE — 3008F PR BODY MASS INDEX (BMI) DOCUMENTED: ICD-10-PCS | Mod: CPTII,S$GLB,, | Performed by: OBSTETRICS & GYNECOLOGY

## 2023-06-01 PROCEDURE — 1159F PR MEDICATION LIST DOCUMENTED IN MEDICAL RECORD: ICD-10-PCS | Mod: CPTII,S$GLB,, | Performed by: OBSTETRICS & GYNECOLOGY

## 2023-06-01 PROCEDURE — 3078F DIAST BP <80 MM HG: CPT | Mod: CPTII,S$GLB,, | Performed by: OBSTETRICS & GYNECOLOGY

## 2023-06-01 PROCEDURE — 82962 GLUCOSE BLOOD TEST: CPT | Mod: ,,, | Performed by: OBSTETRICS & GYNECOLOGY

## 2023-06-01 PROCEDURE — 1159F MED LIST DOCD IN RCRD: CPT | Mod: CPTII,S$GLB,, | Performed by: OBSTETRICS & GYNECOLOGY

## 2023-06-01 NOTE — PROGRESS NOTES
Maternal Fetal Medicine follow up consult    Subjective     Patient ID: Anna Mendenhall is a 32 y.o. female  at 32w2d gestation with Estimated Date of Delivery: 23  who is here for follow  up consultation by M.    Chief Complaint: MFM follow up       Pregnancy complications include:   Problem   Increased BMI complicating pregnancy in third trimester   Pre-Existing Essential Hypertension Complicating Pregnancy in Third Trimester       Patient Active Problem List   Diagnosis    Pre-existing essential hypertension complicating pregnancy in third trimester    Insulin controlled gestational diabetes mellitus (GDM) in third trimester    H/O  delivery, currently pregnant, third trimester    Increased BMI complicating pregnancy in third trimester      No changes to medical, surgical, family, social, or obstetric history.    Interval history since last M visit: None.    Medications:  Current Outpatient Medications   Medication Instructions    aspirin 81 mg, Oral, Daily    ferrous sulfate 325 mg, Oral, Daily    loratadine (CLARITIN) 10 mg, Oral, Daily    norethindrone-ethinyl estradiol (MICROGESTIN ) 1-20 mg-mcg per tablet 1 tablet, Oral, Daily    prenatal vit 10-iron fum-folic (VITAFOL-OB) 65-1 mg Tab Take 1 tablet by mouth daily    progesterone (PROMETRIUM) 200 MG capsule INSERT 1 CAPSULE VAGINALLY AT BEDTIME.    promethazine (PHENERGAN) 12.5 mg, Oral, 2 times daily       HPI She denies any leaking fluid, vaginal bleeding, contractions, decreased fetal movement. Denies headaches, visual disturbances, or epigastric pain    Review of Systems   Constitutional:  Negative for activity change, chills, fatigue and fever.   HENT:  Negative for nasal congestion.    Eyes:  Negative for visual disturbance.   Respiratory: Negative.     Cardiovascular:  Negative for chest pain, palpitations and leg swelling.   Gastrointestinal:  Negative for abdominal pain, constipation, diarrhea, nausea, vomiting and  reflux.   Genitourinary:  Negative for bladder incontinence, frequency, pelvic pain, vaginal bleeding and vaginal discharge.        Good fetal movements   Musculoskeletal:  Negative for back pain, joint swelling and leg pain.   Integumentary:  Negative for rash.   Neurological:  Negative for headaches.   Psychiatric/Behavioral:  Negative for depression. The patient is not nervous/anxious.    All other systems reviewed and are negative.        Objective     Physical Exam  Vitals and nursing note reviewed. Exam conducted with a chaperone present.   Constitutional:       Appearance: Normal appearance.   HENT:      Head: Normocephalic and atraumatic.      Nose: Nose normal. No congestion or epistaxis.   Eyes:      Conjunctiva/sclera: Conjunctivae normal.   Cardiovascular:      Rate and Rhythm: Normal rate.   Pulmonary:      Effort: Pulmonary effort is normal.   Abdominal:      Palpations: Abdomen is soft.   Skin:     Findings: No bruising or rash.   Neurological:      Mental Status: She is alert and oriented to person, place, and time.   Psychiatric:         Mood and Affect: Mood normal.         Behavior: Behavior normal.         Thought Content: Thought content normal.         Judgment: Judgment normal.          Assessment and Plan     ASSESSMENT/PLAN:     32 y.o.  female with IUP at 32w2d    No problem-specific Assessment & Plan notes found for this encounter.    Previous  delivery  Normal fetal growth (2090 g at 37%on 23), normal APRIL, BPP 8     Previously discussed FDA withdrawal of [place]. She has run out of [place], and she has elected to avoid vaginal progesterone.      PTL precautions reviewed.     Mild underlying CHTN with report of elevated BP late last pregnancy  Chronic hypertension complicates up to 2-5% of pregnancies and is associated with significant adverse pregnancy outcomes including  birth, fetal growth restriction, fetal demise, placental abruption, and  delivery.      With blood pressure 110/70, she was advised to continue a low sodium diet avoiding any excessive weight gain. On 3-10-23 24 hour urine protein unable to calculate, creatinine 0.66.     Continue low dose aspirin 81mg po twice daily until 34 6/7 weeks, then adjust to once daily dosing after that until delivery. Preeclampsia precautions reviewed.     Will do follow-up ultrasounds to monitor growth every 3-4 weeks with TWICE WEEKLY fetal testing until the end of pregnancy to alternate with primary OB with added risk of GDM on insulin. Reviewed FKC 3x/day and prn with instructions to report any decreased fetal movement.     Among patients with uncomplicated chronic hypertension with no additional risk factors, delivery at 38-39 weeks appears to provide optimal balance between the risk of adverse fetal and  outcomes. However, will reassess closer to EDC to determine optimal timeframe or GA for delivery, based on current evaluation at that time.     Elevated BMI greater than 35  Stable weight.  Continue low calorie, low fat, low Na, diabetic diet avoiding any additional excessive weight gain. Excess weight gain would be associated with worsening hypertension, worsening gestational diabetes and adverse  outcomes, including fetal demise in utero.     Recurrent gestational diabetes  She is aware of risks associated with diabetes in pregnancy including higher risk for polyhydramnios, fetal macrosomia, lower Apgar scores and  metabolic complications (hypoglycemia, hyperbilirubinemia, hypocalcemia, erythema) and developing preeclampsia.     Continue 2200 calorie ADA diabetic diet. It is recommended that she have 30 grams of carbohydrates with breakfast, and 60 grams with lunch and dinner. In addition, she should have three snacks in between meals with 15 grams of carbohydrates and at bedtime with 30 grams of carbohydrates.     Log reviewed. Will continue 52 units NPH at bedtime. Continue to check blood  glucose 2/day (fasting and alternate post meal checks), and bring log to each visit.     The association of gestational diabetes (50%) with adult-onset type 2 diabetes mellitus was previously discussed. The importance of losing weight after the pregnancy was also previously discussed, as well as doing a 75 g sugar test after postpartum exam and to have annual checkups every 1-3 years for blood sugars to make sure she does not develop diabetes.    F/u in 1 weeks for MFM visit  F/u in 1 weeks for BPP      Components of this note were documented using voice recognition systems; and are subject to errors not corrected at proof reading.  Please contact the author for any clarifications.

## 2023-06-02 ENCOUNTER — TELEPHONE (OUTPATIENT)
Dept: MATERNAL FETAL MEDICINE | Facility: CLINIC | Age: 33
End: 2023-06-02
Payer: MEDICAID

## 2023-06-02 DIAGNOSIS — O24.414 INSULIN CONTROLLED GESTATIONAL DIABETES MELLITUS (GDM) IN THIRD TRIMESTER: Primary | ICD-10-CM

## 2023-06-04 RX ORDER — INSULIN HUMAN 100 [IU]/ML
INJECTION, SUSPENSION SUBCUTANEOUS
Qty: 10 ML | Refills: 3 | Status: SHIPPED | OUTPATIENT
Start: 2023-06-04 | End: 2023-06-06 | Stop reason: SDUPTHER

## 2023-06-06 DIAGNOSIS — O24.414 INSULIN CONTROLLED GESTATIONAL DIABETES MELLITUS (GDM) IN THIRD TRIMESTER: Primary | ICD-10-CM

## 2023-06-06 DIAGNOSIS — O24.414 INSULIN CONTROLLED GESTATIONAL DIABETES MELLITUS (GDM) IN THIRD TRIMESTER: ICD-10-CM

## 2023-06-06 RX ORDER — INSULIN HUMAN 100 [IU]/ML
INJECTION, SUSPENSION SUBCUTANEOUS
Qty: 10 ML | Refills: 3 | Status: SHIPPED | OUTPATIENT
Start: 2023-06-06 | End: 2024-06-03

## 2023-06-08 ENCOUNTER — PROCEDURE VISIT (OUTPATIENT)
Dept: MATERNAL FETAL MEDICINE | Facility: CLINIC | Age: 33
End: 2023-06-08
Payer: MEDICAID

## 2023-06-08 ENCOUNTER — OFFICE VISIT (OUTPATIENT)
Dept: MATERNAL FETAL MEDICINE | Facility: CLINIC | Age: 33
End: 2023-06-08
Payer: MEDICAID

## 2023-06-08 VITALS
DIASTOLIC BLOOD PRESSURE: 76 MMHG | SYSTOLIC BLOOD PRESSURE: 125 MMHG | HEART RATE: 94 BPM | WEIGHT: 255 LBS | BODY MASS INDEX: 34.54 KG/M2 | HEIGHT: 72 IN

## 2023-06-08 DIAGNOSIS — O10.013 PRE-EXISTING ESSENTIAL HYPERTENSION COMPLICATING PREGNANCY IN THIRD TRIMESTER: ICD-10-CM

## 2023-06-08 DIAGNOSIS — O99.213 OBESITY COMPLICATING PREGNANCY IN THIRD TRIMESTER: ICD-10-CM

## 2023-06-08 DIAGNOSIS — O24.414 INSULIN CONTROLLED GESTATIONAL DIABETES MELLITUS (GDM) IN THIRD TRIMESTER: ICD-10-CM

## 2023-06-08 DIAGNOSIS — O24.414 INSULIN CONTROLLED GESTATIONAL DIABETES MELLITUS (GDM) IN THIRD TRIMESTER: Primary | ICD-10-CM

## 2023-06-08 DIAGNOSIS — O09.893 H/O PRETERM DELIVERY, CURRENTLY PREGNANT, THIRD TRIMESTER: ICD-10-CM

## 2023-06-08 PROCEDURE — 76819 FETAL BIOPHYS PROFIL W/O NST: CPT | Mod: S$GLB,,, | Performed by: OBSTETRICS & GYNECOLOGY

## 2023-06-08 PROCEDURE — 1159F MED LIST DOCD IN RCRD: CPT | Mod: CPTII,S$GLB,, | Performed by: OBSTETRICS & GYNECOLOGY

## 2023-06-08 PROCEDURE — 3078F DIAST BP <80 MM HG: CPT | Mod: CPTII,S$GLB,, | Performed by: OBSTETRICS & GYNECOLOGY

## 2023-06-08 PROCEDURE — 3078F PR MOST RECENT DIASTOLIC BLOOD PRESSURE < 80 MM HG: ICD-10-PCS | Mod: CPTII,S$GLB,, | Performed by: OBSTETRICS & GYNECOLOGY

## 2023-06-08 PROCEDURE — 3074F SYST BP LT 130 MM HG: CPT | Mod: CPTII,S$GLB,, | Performed by: OBSTETRICS & GYNECOLOGY

## 2023-06-08 PROCEDURE — 1159F PR MEDICATION LIST DOCUMENTED IN MEDICAL RECORD: ICD-10-PCS | Mod: CPTII,S$GLB,, | Performed by: OBSTETRICS & GYNECOLOGY

## 2023-06-08 PROCEDURE — 99214 OFFICE O/P EST MOD 30 MIN: CPT | Mod: TH,25,S$GLB, | Performed by: OBSTETRICS & GYNECOLOGY

## 2023-06-08 PROCEDURE — 3008F PR BODY MASS INDEX (BMI) DOCUMENTED: ICD-10-PCS | Mod: CPTII,S$GLB,, | Performed by: OBSTETRICS & GYNECOLOGY

## 2023-06-08 PROCEDURE — 3074F PR MOST RECENT SYSTOLIC BLOOD PRESSURE < 130 MM HG: ICD-10-PCS | Mod: CPTII,S$GLB,, | Performed by: OBSTETRICS & GYNECOLOGY

## 2023-06-08 PROCEDURE — 99214 PR OFFICE/OUTPT VISIT, EST, LEVL IV, 30-39 MIN: ICD-10-PCS | Mod: TH,25,S$GLB, | Performed by: OBSTETRICS & GYNECOLOGY

## 2023-06-08 PROCEDURE — 76819 PR US, OB, FETAL BIOPHYSICAL, W/O NST: ICD-10-PCS | Mod: S$GLB,,, | Performed by: OBSTETRICS & GYNECOLOGY

## 2023-06-08 PROCEDURE — 3008F BODY MASS INDEX DOCD: CPT | Mod: CPTII,S$GLB,, | Performed by: OBSTETRICS & GYNECOLOGY

## 2023-06-08 RX ORDER — BLOOD SUGAR DIAGNOSTIC
1 STRIP MISCELLANEOUS 4 TIMES DAILY
COMMUNITY
Start: 2023-05-19

## 2023-06-08 RX ORDER — PEN NEEDLE, DIABETIC 29 G X1/2"
NEEDLE, DISPOSABLE MISCELLANEOUS
COMMUNITY
Start: 2023-03-16 | End: 2023-06-22

## 2023-06-08 RX ORDER — BLOOD-GLUCOSE METER
EACH MISCELLANEOUS
COMMUNITY
Start: 2023-03-13

## 2023-06-08 RX ORDER — CALCIUM CARB/VITAMIN D3/VIT K1 500-100-40
TABLET,CHEWABLE ORAL
COMMUNITY
Start: 2023-03-16 | End: 2023-06-22

## 2023-06-08 RX ORDER — LANCETS
EACH MISCELLANEOUS
COMMUNITY
Start: 2023-03-13

## 2023-06-08 RX ORDER — VITAMIN A, ASCORBIC ACID, VITAMIN D, .ALPHA.-TOCOPHEROL, THIAMINE MONONITRATE, RIBOFLAVIN, NIACIN, PYRIDOXINE HYDROCHLORIDE, FOLIC ACID, CYANOCOBALAMIN, CALCIUM, IRON, MAGNESIUM, ZINC, AND COPPER 2700; 70; 400; 30; 1.6; 1.8; 18; 2.5; 1; 12; 100; 65; 25; 25; 2 [IU]/1; MG/1; [IU]/1; [IU]/1; MG/1; MG/1; MG/1; MG/1; MG/1; UG/1; MG/1; MG/1; MG/1; MG/1; MG/1
1 TABLET ORAL DAILY
COMMUNITY
Start: 2022-12-05

## 2023-06-08 NOTE — ASSESSMENT & PLAN NOTE
Chronic hypertension complicates up to 2-5% of pregnancies and is associated with significant adverse pregnancy outcomes including  birth, fetal growth restriction, fetal demise, placental abruption, and  delivery.     With today's BP: 125/76, she was advised to continue a low sodium diet avoiding any excessive weight gain. On 3-10-23 24 hour urine protein unable to calculate, creatinine 0.66.     Continue low dose aspirin 81mg po twice daily until 34 6/7 weeks, then adjust to once daily dosing after that until delivery. Preeclampsia precautions reviewed.     Will do follow-up ultrasounds to monitor growth every 3-4 weeks with TWICE WEEKLY fetal testing until the end of pregnancy to alternate with primary OB with added risk of GDM on insulin. Reviewed FKC 3x/day and prn with instructions to report any decreased fetal movement.     Among patients with uncomplicated chronic hypertension with no additional risk factors, delivery at 38-39 weeks appears to provide optimal balance between the risk of adverse fetal and  outcomes. However, will reassess closer to EDC to determine optimal timeframe or GA for delivery, based on current evaluation at that time.

## 2023-06-08 NOTE — ASSESSMENT & PLAN NOTE
Elevated BMI greater than 35  Stable weight.  Continue low calorie, low fat, low Na, diabetic diet avoiding any additional excessive weight gain. Excess weight gain would be associated with worsening hypertension, worsening gestational diabetes and adverse  outcomes, including fetal demise in utero.

## 2023-06-08 NOTE — PROGRESS NOTES
"  Maternal Fetal Medicine follow up consult    Subjective     Patient ID: Anna Mendenhall is a 32 y.o. female  at 32w2d gestation with Estimated Date of Delivery: 23  who is here for follow  up consultation by M.    Chief Complaint: MFM FOLLOW UP       Pregnancy complications include:   Problem   Increased BMI complicating pregnancy in third trimester   Pre-Existing Essential Hypertension Complicating Pregnancy in Third Trimester   Insulin Controlled Gestational Diabetes Mellitus (Gdm) in Third Trimester   H/O  Delivery, Currently Pregnant, Third Trimester         Patient Active Problem List   Diagnosis    Pre-existing essential hypertension complicating pregnancy in third trimester    Insulin controlled gestational diabetes mellitus (GDM) in third trimester    H/O  delivery, currently pregnant, third trimester    Increased BMI complicating pregnancy in third trimester      She is on low-salt diet, aspirin, and diabetic diet and insulin 60 units of NPH at bedtime No changes to medical, surgical, family, social, or obstetric history.    Interval history since last M visit: None.    Medications:  Current Outpatient Medications   Medication Instructions    ACCU-CHEK GUIDE ME GLUCOSE MTR Misc use as directed    ACCU-CHEK GUIDE TEST STRIPS Strp 1 strip, 4 times daily    ACCU-CHEK SOFTCLIX LANCETS Misc Use 1 lancet to skin four times a day as directed    aspirin 81 mg, Oral, Daily    BD INSULIN SYRINGE ULTRA-FINE 0.5 mL 31 gauge x 5/16" Syrg Use 1 syringe as directed once a day    ferrous sulfate 325 mg, Oral, Daily    insulin NPH (HUMULIN N NPH U-100 INSULIN) 100 unit/mL injection INJECT 60 UNITS AT BEDTIME    insulin syringe-needle U-100 0.3 mL 31 gauge x 5/16" Syrg No dose, route, or frequency recorded.    insulin syringes, disposable, 1 mL Syrg 1 Syringe, Misc.(Non-Drug; Combo Route), Daily    prenatal vit 10-iron fum-folic (VITAFOL-OB) 65-1 mg Tab 1 tablet, Oral, Daily       HPI She " denies any leaking fluid, vaginal bleeding, contractions, decreased fetal movement. Denies headaches, visual disturbances, or epigastric pain    Review of Systems   Constitutional:  Negative for activity change, chills, fatigue and fever.   HENT:  Negative for nasal congestion.    Eyes:  Negative for visual disturbance.   Respiratory: Negative.     Cardiovascular:  Negative for chest pain, palpitations and leg swelling.   Gastrointestinal:  Negative for abdominal pain, constipation, diarrhea, nausea, vomiting and reflux.   Genitourinary:  Negative for bladder incontinence, frequency, pelvic pain, vaginal bleeding and vaginal discharge.        Good fetal movements   Musculoskeletal:  Negative for back pain, joint swelling and leg pain.   Integumentary:  Negative for rash.   Neurological:  Negative for headaches.   Psychiatric/Behavioral:  Negative for depression. The patient is not nervous/anxious.    All other systems reviewed and are negative.        Objective     Physical Exam  Vitals and nursing note reviewed.   Constitutional:       Appearance: Normal appearance.   HENT:      Head: Normocephalic and atraumatic.      Nose: Nose normal. No congestion or epistaxis.   Eyes:      Conjunctiva/sclera: Conjunctivae normal.   Cardiovascular:      Rate and Rhythm: Normal rate.   Pulmonary:      Effort: Pulmonary effort is normal.   Abdominal:      Palpations: Abdomen is soft.   Skin:     Findings: No bruising or rash.   Neurological:      Mental Status: She is alert and oriented to person, place, and time.   Psychiatric:         Mood and Affect: Mood normal.         Behavior: Behavior normal.         Thought Content: Thought content normal.         Judgment: Judgment normal.          Assessment and Plan     ASSESSMENT/PLAN:     32 y.o.  female with IUP at 32w2d    Insulin controlled gestational diabetes mellitus (GDM) in third trimester    Recurrent gestational diabetes    Normal fetal growth (2090 g at 37%on  23), normal APRIL, BPP 8/8     She is aware of risks associated with diabetes in pregnancy including higher risk for polyhydramnios, fetal macrosomia, lower Apgar scores and  metabolic complications (hypoglycemia, hyperbilirubinemia, hypocalcemia, erythema) and developing preeclampsia.     Continue 2200 calorie ADA diabetic diet. It is recommended that she have 30 grams of carbohydrates with breakfast, and 60 grams with lunch and dinner. In addition, she should have three snacks in between meals with 15 grams of carbohydrates and at bedtime with 30 grams of carbohydrates.     Log reviewed. Will continue 60 units NPH at bedtime. Continue to check blood glucose 2/day (fasting and alternate post meal checks), and bring log to each visit.     The association of gestational diabetes (50%) with adult-onset type 2 diabetes mellitus was previously discussed. The importance of losing weight after the pregnancy was also previously discussed, as well as doing a 75 g sugar test after postpartum exam and to have annual checkups every 1-3 years for blood sugars to make sure she does not develop diabetes.    Increased BMI complicating pregnancy in third trimester     Elevated BMI greater than 35  Stable weight.  Continue low calorie, low fat, low Na, diabetic diet avoiding any additional excessive weight gain. Excess weight gain would be associated with worsening hypertension, worsening gestational diabetes and adverse  outcomes, including fetal demise in utero.    Pre-existing essential hypertension complicating pregnancy in third trimester  Chronic hypertension complicates up to 2-5% of pregnancies and is associated with significant adverse pregnancy outcomes including  birth, fetal growth restriction, fetal demise, placental abruption, and  delivery.     With today's BP: 125/76, she was advised to continue a low sodium diet avoiding any excessive weight gain. On 3-10-23 24 hour urine protein  unable to calculate, creatinine 0.66.     Continue low dose aspirin 81mg po twice daily until 34 6/7 weeks, then adjust to once daily dosing after that until delivery. Preeclampsia precautions reviewed.     Will do follow-up ultrasounds to monitor growth every 3-4 weeks with TWICE WEEKLY fetal testing until the end of pregnancy to alternate with primary OB with added risk of GDM on insulin. Reviewed FKC 3x/day and prn with instructions to report any decreased fetal movement.     Among patients with uncomplicated chronic hypertension with no additional risk factors, delivery at 38-39 weeks appears to provide optimal balance between the risk of adverse fetal and  outcomes. However, will reassess closer to EDC to determine optimal timeframe or GA for delivery, based on current evaluation at that time.    H/O  delivery, currently pregnant, third trimester   Previously discussed FDA withdrawal of [place]. She has run out of [place], and she has elected to avoid vaginal progesterone.      PTL precautions reviewed.          F/u in 1 weeks for MFM visit  F/u in 1 weeks for BPP      Components of this note were documented using voice recognition systems; and are subject to errors not corrected at proof reading.  Please contact the author for any clarifications.

## 2023-06-08 NOTE — ASSESSMENT & PLAN NOTE
Recurrent gestational diabetes    Normal fetal growth (2090 g at 37%on 23), normal APRIL, BPP      She is aware of risks associated with diabetes in pregnancy including higher risk for polyhydramnios, fetal macrosomia, lower Apgar scores and  metabolic complications (hypoglycemia, hyperbilirubinemia, hypocalcemia, erythema) and developing preeclampsia.     Continue 2200 calorie ADA diabetic diet. It is recommended that she have 30 grams of carbohydrates with breakfast, and 60 grams with lunch and dinner. In addition, she should have three snacks in between meals with 15 grams of carbohydrates and at bedtime with 30 grams of carbohydrates.     Log reviewed. Will continue 60 units NPH at bedtime. Continue to check blood glucose 2/day (fasting and alternate post meal checks), and bring log to each visit.     The association of gestational diabetes (50%) with adult-onset type 2 diabetes mellitus was previously discussed. The importance of losing weight after the pregnancy was also previously discussed, as well as doing a 75 g sugar test after postpartum exam and to have annual checkups every 1-3 years for blood sugars to make sure she does not develop diabetes.

## 2023-06-08 NOTE — ASSESSMENT & PLAN NOTE
Previously discussed FDA withdrawal of [place]. She has run out of [place], and she has elected to avoid vaginal progesterone.      PTL precautions reviewed.

## 2023-06-14 DIAGNOSIS — O10.013 PRE-EXISTING ESSENTIAL HYPERTENSION COMPLICATING PREGNANCY IN THIRD TRIMESTER: ICD-10-CM

## 2023-06-14 DIAGNOSIS — O99.213 OBESITY COMPLICATING PREGNANCY IN THIRD TRIMESTER: Primary | ICD-10-CM

## 2023-06-14 DIAGNOSIS — O24.414 INSULIN CONTROLLED GESTATIONAL DIABETES MELLITUS (GDM) IN THIRD TRIMESTER: ICD-10-CM

## 2023-06-15 ENCOUNTER — PROCEDURE VISIT (OUTPATIENT)
Dept: MATERNAL FETAL MEDICINE | Facility: CLINIC | Age: 33
End: 2023-06-15
Payer: MEDICAID

## 2023-06-15 DIAGNOSIS — O24.414 INSULIN CONTROLLED GESTATIONAL DIABETES MELLITUS (GDM) IN THIRD TRIMESTER: ICD-10-CM

## 2023-06-15 DIAGNOSIS — O99.213 OBESITY COMPLICATING PREGNANCY IN THIRD TRIMESTER: ICD-10-CM

## 2023-06-15 DIAGNOSIS — O10.013 PRE-EXISTING ESSENTIAL HYPERTENSION COMPLICATING PREGNANCY IN THIRD TRIMESTER: ICD-10-CM

## 2023-06-15 PROCEDURE — 76819 US MFM PROCEDURE (VIEWPOINT): ICD-10-PCS | Mod: S$GLB,,, | Performed by: OBSTETRICS & GYNECOLOGY

## 2023-06-15 PROCEDURE — 76819 FETAL BIOPHYS PROFIL W/O NST: CPT | Mod: S$GLB,,, | Performed by: OBSTETRICS & GYNECOLOGY

## 2023-06-22 ENCOUNTER — OFFICE VISIT (OUTPATIENT)
Dept: MATERNAL FETAL MEDICINE | Facility: CLINIC | Age: 33
End: 2023-06-22
Payer: MEDICAID

## 2023-06-22 ENCOUNTER — PROCEDURE VISIT (OUTPATIENT)
Dept: MATERNAL FETAL MEDICINE | Facility: CLINIC | Age: 33
End: 2023-06-22
Payer: MEDICAID

## 2023-06-22 VITALS
DIASTOLIC BLOOD PRESSURE: 75 MMHG | HEIGHT: 72 IN | HEART RATE: 93 BPM | BODY MASS INDEX: 34.81 KG/M2 | WEIGHT: 257 LBS | SYSTOLIC BLOOD PRESSURE: 115 MMHG

## 2023-06-22 DIAGNOSIS — O24.414 INSULIN CONTROLLED GESTATIONAL DIABETES MELLITUS (GDM) IN THIRD TRIMESTER: ICD-10-CM

## 2023-06-22 DIAGNOSIS — O09.893 H/O PRETERM DELIVERY, CURRENTLY PREGNANT, THIRD TRIMESTER: ICD-10-CM

## 2023-06-22 DIAGNOSIS — O10.013 PRE-EXISTING ESSENTIAL HYPERTENSION COMPLICATING PREGNANCY IN THIRD TRIMESTER: ICD-10-CM

## 2023-06-22 DIAGNOSIS — O99.213 OBESITY COMPLICATING PREGNANCY IN THIRD TRIMESTER: ICD-10-CM

## 2023-06-22 DIAGNOSIS — O24.414 INSULIN CONTROLLED GESTATIONAL DIABETES MELLITUS (GDM) IN THIRD TRIMESTER: Primary | ICD-10-CM

## 2023-06-22 PROCEDURE — 1159F PR MEDICATION LIST DOCUMENTED IN MEDICAL RECORD: ICD-10-PCS | Mod: CPTII,S$GLB,, | Performed by: OBSTETRICS & GYNECOLOGY

## 2023-06-22 PROCEDURE — 76819 PR US, OB, FETAL BIOPHYSICAL, W/O NST: ICD-10-PCS | Mod: S$GLB,,, | Performed by: OBSTETRICS & GYNECOLOGY

## 2023-06-22 PROCEDURE — 3008F BODY MASS INDEX DOCD: CPT | Mod: CPTII,S$GLB,, | Performed by: OBSTETRICS & GYNECOLOGY

## 2023-06-22 PROCEDURE — 3074F SYST BP LT 130 MM HG: CPT | Mod: CPTII,S$GLB,, | Performed by: OBSTETRICS & GYNECOLOGY

## 2023-06-22 PROCEDURE — 3074F PR MOST RECENT SYSTOLIC BLOOD PRESSURE < 130 MM HG: ICD-10-PCS | Mod: CPTII,S$GLB,, | Performed by: OBSTETRICS & GYNECOLOGY

## 2023-06-22 PROCEDURE — 3078F PR MOST RECENT DIASTOLIC BLOOD PRESSURE < 80 MM HG: ICD-10-PCS | Mod: CPTII,S$GLB,, | Performed by: OBSTETRICS & GYNECOLOGY

## 2023-06-22 PROCEDURE — 3008F PR BODY MASS INDEX (BMI) DOCUMENTED: ICD-10-PCS | Mod: CPTII,S$GLB,, | Performed by: OBSTETRICS & GYNECOLOGY

## 2023-06-22 PROCEDURE — 99214 PR OFFICE/OUTPT VISIT, EST, LEVL IV, 30-39 MIN: ICD-10-PCS | Mod: TH,S$GLB,, | Performed by: OBSTETRICS & GYNECOLOGY

## 2023-06-22 PROCEDURE — 99214 OFFICE O/P EST MOD 30 MIN: CPT | Mod: TH,S$GLB,, | Performed by: OBSTETRICS & GYNECOLOGY

## 2023-06-22 PROCEDURE — 76819 FETAL BIOPHYS PROFIL W/O NST: CPT | Mod: S$GLB,,, | Performed by: OBSTETRICS & GYNECOLOGY

## 2023-06-22 PROCEDURE — 3078F DIAST BP <80 MM HG: CPT | Mod: CPTII,S$GLB,, | Performed by: OBSTETRICS & GYNECOLOGY

## 2023-06-22 PROCEDURE — 1159F MED LIST DOCD IN RCRD: CPT | Mod: CPTII,S$GLB,, | Performed by: OBSTETRICS & GYNECOLOGY

## 2023-06-22 NOTE — PROGRESS NOTES
Maternal Fetal Medicine follow up consult    Subjective     Patient ID: Anna Mendenhall is a 32 y.o. female  at 35w2d gestation with Estimated Date of Delivery: 23  who is here for follow  up consultation by MFM.    Chief Complaint: MFM follow up with US (H/o PTD and GDM)      Pregnancy complications include:   Patient Active Problem List   Diagnosis    Pre-existing essential hypertension complicating pregnancy in third trimester    Insulin controlled gestational diabetes mellitus (GDM) in third trimester    H/O  delivery, currently pregnant, third trimester    Increased BMI complicating pregnancy in third trimester        No changes to medical, surgical, family, social, or obstetric history.      HPI: She has history of  delivery and was previously on [place], elected to avoid vaginal progesterone . She has chronic hypertension. She is on low-salt diet, aspirin, and diabetic diet and insulin 60 units of NPH at bedtime. She had elevated BMI over 30 at consult visit.      Interval history since last MFM visit: None.. She denies any leaking fluid, vaginal bleeding, contractions, decreased fetal movement. Denies headaches, visual disturbances, or epigastric pain    Medications:  Current Outpatient Medications   Medication Instructions    ACCU-CHEK GUIDE ME GLUCOSE MTR Misc use as directed    ACCU-CHEK GUIDE TEST STRIPS Strp 1 strip, 4 times daily    ACCU-CHEK SOFTCLIX LANCETS Misc Use 1 lancet to skin four times a day as directed    aspirin 81 mg, Oral, Daily    ferrous sulfate 325 mg, Oral, Daily    insulin NPH (HUMULIN N NPH U-100 INSULIN) 100 unit/mL injection INJECT 60 UNITS AT BEDTIME    insulin syringes, disposable, 1 mL Syrg 1 Syringe, Misc.(Non-Drug; Combo Route), Daily    prenatal vit 10-iron fum-folic (VITAFOL-OB) 65-1 mg Tab 1 tablet, Oral, Daily       Review of Systems   Constitutional:  Negative for activity change.   Eyes:  Negative for visual disturbance.   Respiratory:  Negative.     Cardiovascular:  Negative for leg swelling.   Gastrointestinal:  Negative for abdominal pain, constipation, diarrhea, nausea, vomiting and reflux.   Genitourinary:  Negative for bladder incontinence, frequency, pelvic pain, vaginal bleeding and vaginal discharge.        Good fetal movements   Musculoskeletal:  Negative for back pain.   Neurological:  Negative for headaches.   All other systems reviewed and are negative.        Objective     Vitals:    23 1040   BP: 115/75   Pulse: 93       Physical Exam  Vitals and nursing note reviewed.   Constitutional:       Appearance: Normal appearance.      Comments: Increased BMI   HENT:      Head: Normocephalic and atraumatic.      Nose: Nose normal. No congestion.   Eyes:      Conjunctiva/sclera: Conjunctivae normal.   Cardiovascular:      Rate and Rhythm: Normal rate.   Pulmonary:      Effort: Pulmonary effort is normal.   Skin:     Findings: No bruising or rash.   Neurological:      Mental Status: She is alert and oriented to person, place, and time.   Psychiatric:         Mood and Affect: Mood normal.         Behavior: Behavior normal.         Thought Content: Thought content normal.         Judgment: Judgment normal.          Assessment and Plan     ASSESSMENT/PLAN:     32 y.o.  female with IUP at 35w2d    Insulin controlled gestational diabetes mellitus (GDM) in third trimester  Recurrent gestational diabetes    Normal fetal growth (2090 g at 37%on 23), normal APRIL, BPP 8/8     She is aware of risks associated with diabetes in pregnancy including higher risk for polyhydramnios, fetal macrosomia, lower Apgar scores and  metabolic complications (hypoglycemia, hyperbilirubinemia, hypocalcemia, erythema) and developing preeclampsia.     Continue 2200 calorie ADA diabetic diet. It is recommended that she have 30 grams of carbohydrates with breakfast, and 60 grams with lunch and dinner. In addition, she should have three snacks in  between meals with 15 grams of carbohydrates and at bedtime with 30 grams of carbohydrates.     Log reviewed. Will continue 60 units NPH at bedtime. Continue to check blood glucose 2/day (fasting and alternate post meal checks), and bring log to each visit.     The association of gestational diabetes (50%) with adult-onset type 2 diabetes mellitus was previously discussed. The importance of losing weight after the pregnancy was also previously discussed, as well as doing a 75 g sugar test after postpartum exam and to have annual checkups every 1-3 years for blood sugars to make sure she does not develop diabetes.      Increased BMI complicating pregnancy in third trimester  Increased BMI complicating pregnancy in third trimester  Stable weight.  Continue low calorie, low fat, low Na, diabetic diet avoiding any additional excessive weight gain. Excess weight gain would be associated with worsening hypertension, worsening gestational diabetes and adverse  outcomes, including fetal demise in utero.    Pre-existing essential hypertension complicating pregnancy in third trimester  Chronic hypertension complicates up to 2-5% of pregnancies and is associated with significant adverse pregnancy outcomes including  birth, fetal growth restriction, fetal demise, placental abruption, and  delivery.     BP Readings from Last 1 Encounters:   23 115/75     She was advised to continue a low sodium diet avoiding any excessive weight gain. On 3-10-23 24 hour urine protein unable to calculate, creatinine 0.66.     Advised patient to decrease low dose aspirin 81mg po to once daily until delivery. Preeclampsia precautions reviewed.     Will do follow-up ultrasounds to monitor growth every 3-4 weeks with TWICE WEEKLY fetal testing until the end of pregnancy to alternate with primary OB with added risk of GDM on insulin. Reviewed FKC 3x/day and prn with instructions to report any decreased fetal movement.      Among patients with uncomplicated chronic hypertension with no additional risk factors, delivery at 38-39 weeks appears to provide optimal balance between the risk of adverse fetal and  outcomes. However, will reassess closer to EDC to determine optimal timeframe or GA for delivery, based on current evaluation at that time.    H/O  delivery, currently pregnant, third trimester  No  labor symptoms.   labor instructions given.        Follow up in about 1 week (around 2023) for BPP only, 2 weeks for MFM followup, BPP, and repeat US.       Components of this note were documented using voice recognition systems; and are subject to errors not corrected at proof reading.  Please contact the author for any clarifications.

## 2023-06-22 NOTE — ASSESSMENT & PLAN NOTE
Chronic hypertension complicates up to 2-5% of pregnancies and is associated with significant adverse pregnancy outcomes including  birth, fetal growth restriction, fetal demise, placental abruption, and  delivery.     BP Readings from Last 1 Encounters:   23 115/75     She was advised to continue a low sodium diet avoiding any excessive weight gain. On 3-10-23 24 hour urine protein unable to calculate, creatinine 0.66.     Advised patient to decrease low dose aspirin 81mg po to once daily until delivery. Preeclampsia precautions reviewed.     Will do follow-up ultrasounds to monitor growth every 3-4 weeks with TWICE WEEKLY fetal testing until the end of pregnancy to alternate with primary OB with added risk of GDM on insulin. Reviewed FKC 3x/day and prn with instructions to report any decreased fetal movement.     Among patients with uncomplicated chronic hypertension with no additional risk factors, delivery at 38-39 weeks appears to provide optimal balance between the risk of adverse fetal and  outcomes. However, will reassess closer to EDC to determine optimal timeframe or GA for delivery, based on current evaluation at that time.

## 2023-06-22 NOTE — ASSESSMENT & PLAN NOTE
Increased BMI complicating pregnancy in third trimester  Stable weight.  Continue low calorie, low fat, low Na, diabetic diet avoiding any additional excessive weight gain. Excess weight gain would be associated with worsening hypertension, worsening gestational diabetes and adverse  outcomes, including fetal demise in utero.

## 2023-06-27 ENCOUNTER — LAB VISIT (OUTPATIENT)
Dept: LAB | Facility: HOSPITAL | Age: 33
End: 2023-06-27
Attending: OBSTETRICS & GYNECOLOGY
Payer: MEDICAID

## 2023-06-27 DIAGNOSIS — O10.013 PRE-EXISTING ESSENTIAL HYPERTENSION COMPLICATING PREGNANCY IN THIRD TRIMESTER: ICD-10-CM

## 2023-06-27 DIAGNOSIS — O24.414 INSULIN CONTROLLED GESTATIONAL DIABETES MELLITUS (GDM) IN THIRD TRIMESTER: Primary | ICD-10-CM

## 2023-06-27 DIAGNOSIS — O09.93 ENCOUNTER FOR SUPERVISION OF HIGH RISK PREGNANCY IN THIRD TRIMESTER, ANTEPARTUM: ICD-10-CM

## 2023-06-27 LAB
BASOPHILS # BLD AUTO: 0.02 X10(3)/MCL
BASOPHILS NFR BLD AUTO: 0.3 %
EOSINOPHIL # BLD AUTO: 0.07 X10(3)/MCL (ref 0–0.9)
EOSINOPHIL NFR BLD AUTO: 1.2 %
ERYTHROCYTE [DISTWIDTH] IN BLOOD BY AUTOMATED COUNT: 14.6 % (ref 11.5–17)
HCT VFR BLD AUTO: 37.9 % (ref 37–47)
HGB BLD-MCNC: 11.5 G/DL (ref 12–16)
IMM GRANULOCYTES # BLD AUTO: 0.01 X10(3)/MCL (ref 0–0.04)
IMM GRANULOCYTES NFR BLD AUTO: 0.2 %
LYMPHOCYTES # BLD AUTO: 1.24 X10(3)/MCL (ref 0.6–4.6)
LYMPHOCYTES NFR BLD AUTO: 20.6 %
MCH RBC QN AUTO: 25.1 PG (ref 27–31)
MCHC RBC AUTO-ENTMCNC: 30.3 G/DL (ref 33–36)
MCV RBC AUTO: 82.6 FL (ref 80–94)
MONOCYTES # BLD AUTO: 0.46 X10(3)/MCL (ref 0.1–1.3)
MONOCYTES NFR BLD AUTO: 7.6 %
NEUTROPHILS # BLD AUTO: 4.23 X10(3)/MCL (ref 2.1–9.2)
NEUTROPHILS NFR BLD AUTO: 70.1 %
PLATELET # BLD AUTO: 321 X10(3)/MCL (ref 130–400)
PMV BLD AUTO: 9.9 FL (ref 7.4–10.4)
RBC # BLD AUTO: 4.59 X10(6)/MCL (ref 4.2–5.4)
WBC # SPEC AUTO: 6.03 X10(3)/MCL (ref 4.5–11.5)

## 2023-06-27 PROCEDURE — 87389 HIV-1 AG W/HIV-1&-2 AB AG IA: CPT

## 2023-06-27 PROCEDURE — 85025 COMPLETE CBC W/AUTO DIFF WBC: CPT

## 2023-06-28 LAB
HIV 1+2 AB+HIV1 P24 AG SERPL QL IA: NONREACTIVE
PRENATAL STREP B CULTURE: NORMAL

## 2023-06-28 PROCEDURE — 87081 CULTURE SCREEN ONLY: CPT | Performed by: OBSTETRICS & GYNECOLOGY

## 2023-06-29 ENCOUNTER — PROCEDURE VISIT (OUTPATIENT)
Dept: MATERNAL FETAL MEDICINE | Facility: CLINIC | Age: 33
End: 2023-06-29
Payer: MEDICAID

## 2023-06-29 DIAGNOSIS — O10.013 PRE-EXISTING ESSENTIAL HYPERTENSION COMPLICATING PREGNANCY IN THIRD TRIMESTER: ICD-10-CM

## 2023-06-29 DIAGNOSIS — O24.414 INSULIN CONTROLLED GESTATIONAL DIABETES MELLITUS (GDM) IN THIRD TRIMESTER: ICD-10-CM

## 2023-06-29 PROCEDURE — 76816 US MFM PROCEDURE (VIEWPOINT): ICD-10-PCS | Mod: S$GLB,,, | Performed by: OBSTETRICS & GYNECOLOGY

## 2023-06-29 PROCEDURE — 76819 US MFM PROCEDURE (VIEWPOINT): ICD-10-PCS | Mod: S$GLB,,, | Performed by: OBSTETRICS & GYNECOLOGY

## 2023-06-29 PROCEDURE — 76816 OB US FOLLOW-UP PER FETUS: CPT | Mod: S$GLB,,, | Performed by: OBSTETRICS & GYNECOLOGY

## 2023-06-29 PROCEDURE — 76819 FETAL BIOPHYS PROFIL W/O NST: CPT | Mod: S$GLB,,, | Performed by: OBSTETRICS & GYNECOLOGY

## 2023-07-06 ENCOUNTER — OFFICE VISIT (OUTPATIENT)
Dept: MATERNAL FETAL MEDICINE | Facility: CLINIC | Age: 33
End: 2023-07-06
Payer: MEDICAID

## 2023-07-06 ENCOUNTER — PROCEDURE VISIT (OUTPATIENT)
Dept: MATERNAL FETAL MEDICINE | Facility: CLINIC | Age: 33
End: 2023-07-06
Payer: MEDICAID

## 2023-07-06 VITALS
DIASTOLIC BLOOD PRESSURE: 81 MMHG | SYSTOLIC BLOOD PRESSURE: 134 MMHG | HEIGHT: 72 IN | HEART RATE: 99 BPM | BODY MASS INDEX: 34.54 KG/M2 | WEIGHT: 255 LBS

## 2023-07-06 DIAGNOSIS — O99.213 OBESITY COMPLICATING PREGNANCY IN THIRD TRIMESTER: ICD-10-CM

## 2023-07-06 DIAGNOSIS — O24.414 INSULIN CONTROLLED GESTATIONAL DIABETES MELLITUS (GDM) IN THIRD TRIMESTER: ICD-10-CM

## 2023-07-06 DIAGNOSIS — O09.893 H/O PRETERM DELIVERY, CURRENTLY PREGNANT, THIRD TRIMESTER: ICD-10-CM

## 2023-07-06 DIAGNOSIS — O10.013 PRE-EXISTING ESSENTIAL HYPERTENSION COMPLICATING PREGNANCY IN THIRD TRIMESTER: ICD-10-CM

## 2023-07-06 DIAGNOSIS — O24.414 INSULIN CONTROLLED GESTATIONAL DIABETES MELLITUS (GDM) IN THIRD TRIMESTER: Primary | ICD-10-CM

## 2023-07-06 PROCEDURE — 3075F PR MOST RECENT SYSTOLIC BLOOD PRESS GE 130-139MM HG: ICD-10-PCS | Mod: CPTII,S$GLB,, | Performed by: OBSTETRICS & GYNECOLOGY

## 2023-07-06 PROCEDURE — 99214 OFFICE O/P EST MOD 30 MIN: CPT | Mod: TH,25,S$GLB, | Performed by: OBSTETRICS & GYNECOLOGY

## 2023-07-06 PROCEDURE — 76819 FETAL BIOPHYS PROFIL W/O NST: CPT | Mod: S$GLB,,, | Performed by: OBSTETRICS & GYNECOLOGY

## 2023-07-06 PROCEDURE — 3079F DIAST BP 80-89 MM HG: CPT | Mod: CPTII,S$GLB,, | Performed by: OBSTETRICS & GYNECOLOGY

## 2023-07-06 PROCEDURE — 76819 PR US, OB, FETAL BIOPHYSICAL, W/O NST: ICD-10-PCS | Mod: S$GLB,,, | Performed by: OBSTETRICS & GYNECOLOGY

## 2023-07-06 PROCEDURE — 3075F SYST BP GE 130 - 139MM HG: CPT | Mod: CPTII,S$GLB,, | Performed by: OBSTETRICS & GYNECOLOGY

## 2023-07-06 PROCEDURE — 1159F MED LIST DOCD IN RCRD: CPT | Mod: CPTII,S$GLB,, | Performed by: OBSTETRICS & GYNECOLOGY

## 2023-07-06 PROCEDURE — 3008F PR BODY MASS INDEX (BMI) DOCUMENTED: ICD-10-PCS | Mod: CPTII,S$GLB,, | Performed by: OBSTETRICS & GYNECOLOGY

## 2023-07-06 PROCEDURE — 1159F PR MEDICATION LIST DOCUMENTED IN MEDICAL RECORD: ICD-10-PCS | Mod: CPTII,S$GLB,, | Performed by: OBSTETRICS & GYNECOLOGY

## 2023-07-06 PROCEDURE — 3008F BODY MASS INDEX DOCD: CPT | Mod: CPTII,S$GLB,, | Performed by: OBSTETRICS & GYNECOLOGY

## 2023-07-06 PROCEDURE — 3079F PR MOST RECENT DIASTOLIC BLOOD PRESSURE 80-89 MM HG: ICD-10-PCS | Mod: CPTII,S$GLB,, | Performed by: OBSTETRICS & GYNECOLOGY

## 2023-07-06 PROCEDURE — 99214 PR OFFICE/OUTPT VISIT, EST, LEVL IV, 30-39 MIN: ICD-10-PCS | Mod: TH,25,S$GLB, | Performed by: OBSTETRICS & GYNECOLOGY

## 2023-07-06 NOTE — PROGRESS NOTES
Maternal Fetal Medicine follow up consult    Subjective     Patient ID: Anna Mendenhall is a 32 y.o. female  at 37w2d gestation with Estimated Date of Delivery: 23  who is here for follow  up consultation by M.    Chief Complaint: MFM follow up with US (Insulin controlled GDM.)      Pregnancy complications include:   Patient Active Problem List   Diagnosis    Pre-existing essential hypertension complicating pregnancy in third trimester    Insulin controlled gestational diabetes mellitus (GDM) in third trimester    H/O  delivery, currently pregnant, third trimester    Increased BMI complicating pregnancy in third trimester        No changes to medical, surgical, family, social, or obstetric history.      HPI: She has history of  delivery and was previously on [place], elected to avoid vaginal progesterone . She has chronic hypertension. She is on low-salt diet, 81 mg aspirin daily, and diabetic diet and insulin 60 units of NPH at bedtime. She had elevated BMI over 30 at consult visit.      Interval history since last MFM visit: None.. She denies any leaking fluid, vaginal bleeding, contractions, decreased fetal movement. Denies headaches, visual disturbances, or epigastric pain    Medications:  Current Outpatient Medications   Medication Instructions    ACCU-CHEK GUIDE ME GLUCOSE MTR Misc use as directed    ACCU-CHEK GUIDE TEST STRIPS Strp 1 strip, 4 times daily    ACCU-CHEK SOFTCLIX LANCETS Misc Use 1 lancet to skin four times a day as directed    aspirin 81 mg, Oral, Daily    ferrous sulfate 325 mg, Oral, Daily    insulin NPH (HUMULIN N NPH U-100 INSULIN) 100 unit/mL injection INJECT 60 UNITS AT BEDTIME    insulin syringes, disposable, 1 mL Syrg 1 Syringe, Misc.(Non-Drug; Combo Route), Daily    prenatal vit 10-iron fum-folic (VITAFOL-OB) 65-1 mg Tab 1 tablet, Oral, Daily       Review of Systems   Constitutional:  Negative for activity change.   Eyes:  Negative for visual disturbance.    Respiratory: Negative.     Cardiovascular:  Negative for leg swelling.   Gastrointestinal:  Negative for abdominal pain, constipation, diarrhea, nausea, vomiting and reflux.   Genitourinary:  Negative for bladder incontinence, frequency, pelvic pain, vaginal bleeding and vaginal discharge.        Good fetal movements   Musculoskeletal:  Negative for back pain.   Neurological:  Negative for headaches.   All other systems reviewed and are negative.        Objective     Vitals:    23 1133   BP: 134/81   Pulse: 99       Physical Exam  Vitals and nursing note reviewed.   Constitutional:       Appearance: Normal appearance.      Comments: Increased BMI   HENT:      Head: Normocephalic and atraumatic.      Nose: Nose normal. No congestion.   Eyes:      Conjunctiva/sclera: Conjunctivae normal.   Cardiovascular:      Rate and Rhythm: Normal rate.   Pulmonary:      Effort: Pulmonary effort is normal.   Skin:     Findings: No bruising or rash.   Neurological:      Mental Status: She is alert and oriented to person, place, and time.   Psychiatric:         Mood and Affect: Mood normal.         Behavior: Behavior normal.         Thought Content: Thought content normal.         Judgment: Judgment normal.          Assessment and Plan     ASSESSMENT/PLAN:     32 y.o.  female with IUP at 37w2d      Insulin controlled gestational diabetes mellitus (GDM) in third trimester  Recurrent gestational diabetes     Normal fetal growth ( EFW at the 49% and the AC at the 58%. The EFW is 2997 g on ), .  normal APRIL, BPP 8/8     She is aware of risks associated with diabetes in pregnancy including higher risk for polyhydramnios, fetal macrosomia, lower Apgar scores and  metabolic complications (hypoglycemia, hyperbilirubinemia, hypocalcemia, erythema) and developing preeclampsia.     Continue 2200 calorie ADA diabetic diet. It is recommended that she have 30 grams of carbohydrates with breakfast, and 60 grams with  lunch and dinner. In addition, she should have three snacks in between meals with 15 grams of carbohydrates and at bedtime with 30 grams of carbohydrates.     Log reviewed. Will continue 60 units NPH at bedtime. Continue to check blood glucose 2/day (fasting and alternate post meal checks), and bring log to each visit.     The association of gestational diabetes (50%) with adult-onset type 2 diabetes mellitus was previously discussed. The importance of losing weight after the pregnancy was also previously discussed, as well as doing a 75 g sugar test after postpartum exam and to have annual checkups every 1-3 years for blood sugars to make sure she does not develop diabetes.        Increased BMI complicating pregnancy in third trimester    Stable weight.  Continue low calorie, low fat, low Na, diabetic diet avoiding any additional excessive weight gain. Excess weight gain would be associated with worsening hypertension, worsening gestational diabetes and adverse  outcomes, including fetal demise in utero.     Pre-existing essential hypertension complicating pregnancy in third trimester  Chronic hypertension complicates up to 2-5% of pregnancies and is associated with significant adverse pregnancy outcomes including  birth, fetal growth restriction, fetal demise, placental abruption, and  delivery.     BP Readings from Last 1 Encounters:   23 134/81           She was advised to continue a low sodium diet avoiding any excessive weight gain. On 3-10-23 24 hour urine protein unable to calculate, creatinine 0.66.     continue low dose aspirin 81mg po to once daily until delivery. Preeclampsia precautions reviewed.     Will do follow-up ultrasounds to monitor growth every 3-4 weeks with TWICE WEEKLY fetal testing until the end of pregnancy to alternate with primary OB with added risk of GDM on insulin. Reviewed FKC 3x/day and prn with instructions to report any decreased fetal movement.      With chronic hypertension, gestational diabetes requiring a higher dose of insulin, and increased BMI, delivery at 38 weeks' gestation versus 39 weeks' gestation offers less risks of  morbidity mortality come dictated by continued pregnancy with a higher risk of prematurity complications.  She is scheduled delivery at 38 weeks' gestation and that appears reasonable balance between those concerns.     H/O  delivery, currently pregnant, third trimester  No  labor symptoms.   labor instructions given.           No follow-ups on file.       Components of this note were documented using voice recognition systems; and are subject to errors not corrected at proof reading.  Please contact the author for any clarifications.

## 2023-07-11 ENCOUNTER — HOSPITAL ENCOUNTER (INPATIENT)
Facility: HOSPITAL | Age: 33
LOS: 2 days | Discharge: HOME | End: 2023-07-13
Attending: OBSTETRICS & GYNECOLOGY | Admitting: OBSTETRICS & GYNECOLOGY
Payer: MEDICAID

## 2023-07-11 ENCOUNTER — ANESTHESIA (OUTPATIENT)
Dept: OBSTETRICS AND GYNECOLOGY | Facility: HOSPITAL | Age: 33
End: 2023-07-11
Payer: MEDICAID

## 2023-07-11 ENCOUNTER — ANESTHESIA EVENT (OUTPATIENT)
Dept: OBSTETRICS AND GYNECOLOGY | Facility: HOSPITAL | Age: 33
End: 2023-07-11
Payer: MEDICAID

## 2023-07-11 VITALS — RESPIRATION RATE: 18 BRPM

## 2023-07-11 DIAGNOSIS — Z34.90 ENCOUNTER FOR ELECTIVE INDUCTION OF LABOR: ICD-10-CM

## 2023-07-11 DIAGNOSIS — Z34.90 ENCOUNTER FOR INDUCTION OF LABOR: ICD-10-CM

## 2023-07-11 DIAGNOSIS — O24.414 INSULIN CONTROLLED GESTATIONAL DIABETES MELLITUS (GDM) IN THIRD TRIMESTER: Primary | ICD-10-CM

## 2023-07-11 LAB
BASOPHILS # BLD AUTO: 0.02 X10(3)/MCL
BASOPHILS NFR BLD AUTO: 0.3 %
CBG: 98
EOSINOPHIL # BLD AUTO: 0.06 X10(3)/MCL (ref 0–0.9)
EOSINOPHIL NFR BLD AUTO: 0.8 %
ERYTHROCYTE [DISTWIDTH] IN BLOOD BY AUTOMATED COUNT: 14.6 % (ref 11.5–17)
GROUP & RH: NORMAL
HCT VFR BLD AUTO: 34 % (ref 37–47)
HGB BLD-MCNC: 11.1 G/DL (ref 12–16)
IMM GRANULOCYTES # BLD AUTO: 0.02 X10(3)/MCL (ref 0–0.04)
IMM GRANULOCYTES NFR BLD AUTO: 0.3 %
INDIRECT COOMBS GEL: NORMAL
LYMPHOCYTES # BLD AUTO: 1.74 X10(3)/MCL (ref 0.6–4.6)
LYMPHOCYTES NFR BLD AUTO: 23.3 %
MCH RBC QN AUTO: 25.9 PG (ref 27–31)
MCHC RBC AUTO-ENTMCNC: 32.6 G/DL (ref 33–36)
MCV RBC AUTO: 79.4 FL (ref 80–94)
MONOCYTES # BLD AUTO: 0.62 X10(3)/MCL (ref 0.1–1.3)
MONOCYTES NFR BLD AUTO: 8.3 %
NEUTROPHILS # BLD AUTO: 5 X10(3)/MCL (ref 2.1–9.2)
NEUTROPHILS NFR BLD AUTO: 67 %
NRBC BLD AUTO-RTO: 0 %
PLATELET # BLD AUTO: 272 X10(3)/MCL (ref 130–400)
PMV BLD AUTO: 10.1 FL (ref 7.4–10.4)
POCT GLUCOSE: 65 MG/DL (ref 70–110)
POCT GLUCOSE: 75 MG/DL (ref 70–110)
POCT GLUCOSE: 98 MG/DL (ref 70–110)
RBC # BLD AUTO: 4.28 X10(6)/MCL (ref 4.2–5.4)
SPECIMEN OUTDATE: NORMAL
T PALLIDUM AB SER QL: NONREACTIVE
WBC # SPEC AUTO: 7.46 X10(3)/MCL (ref 4.5–11.5)

## 2023-07-11 PROCEDURE — 25000003 PHARM REV CODE 250: Performed by: ANESTHESIOLOGY

## 2023-07-11 PROCEDURE — 63600175 PHARM REV CODE 636 W HCPCS: Performed by: OBSTETRICS & GYNECOLOGY

## 2023-07-11 PROCEDURE — 86900 BLOOD TYPING SEROLOGIC ABO: CPT | Performed by: OBSTETRICS & GYNECOLOGY

## 2023-07-11 PROCEDURE — 85025 COMPLETE CBC W/AUTO DIFF WBC: CPT | Performed by: OBSTETRICS & GYNECOLOGY

## 2023-07-11 PROCEDURE — 72200005 HC VAGINAL DELIVERY LEVEL II

## 2023-07-11 PROCEDURE — 59409 OBSTETRICAL CARE: CPT | Mod: AA,,, | Performed by: ANESTHESIOLOGY

## 2023-07-11 PROCEDURE — 86780 TREPONEMA PALLIDUM: CPT | Performed by: OBSTETRICS & GYNECOLOGY

## 2023-07-11 PROCEDURE — 51702 INSERT TEMP BLADDER CATH: CPT

## 2023-07-11 PROCEDURE — 25000003 PHARM REV CODE 250: Performed by: OBSTETRICS & GYNECOLOGY

## 2023-07-11 PROCEDURE — 62326 NJX INTERLAMINAR LMBR/SAC: CPT | Performed by: ANESTHESIOLOGY

## 2023-07-11 PROCEDURE — 63600175 PHARM REV CODE 636 W HCPCS: Performed by: ANESTHESIOLOGY

## 2023-07-11 PROCEDURE — 94761 N-INVAS EAR/PLS OXIMETRY MLT: CPT

## 2023-07-11 PROCEDURE — 59409 PRA ETRICAL CARE,VAG DELIV ONLY: ICD-10-PCS | Mod: AA,,, | Performed by: ANESTHESIOLOGY

## 2023-07-11 PROCEDURE — 72100002 HC LABOR CARE, 1ST 8 HOURS

## 2023-07-11 PROCEDURE — 11000001 HC ACUTE MED/SURG PRIVATE ROOM

## 2023-07-11 RX ORDER — OXYTOCIN/RINGER'S LACTATE 30/500 ML
95 PLASTIC BAG, INJECTION (ML) INTRAVENOUS ONCE
Status: COMPLETED | OUTPATIENT
Start: 2023-07-11 | End: 2023-07-11

## 2023-07-11 RX ORDER — GLUCAGON 1 MG
1 KIT INJECTION CONTINUOUS PRN
Status: CANCELLED | OUTPATIENT
Start: 2023-07-11

## 2023-07-11 RX ORDER — OXYCODONE AND ACETAMINOPHEN 5; 325 MG/1; MG/1
1 TABLET ORAL EVERY 4 HOURS PRN
Status: DISCONTINUED | OUTPATIENT
Start: 2023-07-11 | End: 2023-07-13 | Stop reason: HOSPADM

## 2023-07-11 RX ORDER — OXYTOCIN/RINGER'S LACTATE 30/500 ML
0-30 PLASTIC BAG, INJECTION (ML) INTRAVENOUS CONTINUOUS
Status: DISCONTINUED | OUTPATIENT
Start: 2023-07-11 | End: 2023-07-13 | Stop reason: HOSPADM

## 2023-07-11 RX ORDER — LIDOCAINE HYDROCHLORIDE 10 MG/ML
10 INJECTION INFILTRATION; PERINEURAL ONCE AS NEEDED
Status: DISCONTINUED | OUTPATIENT
Start: 2023-07-11 | End: 2023-07-13 | Stop reason: HOSPADM

## 2023-07-11 RX ORDER — MISOPROSTOL 100 UG/1
TABLET ORAL
Status: DISPENSED
Start: 2023-07-11 | End: 2023-07-11

## 2023-07-11 RX ORDER — DIPHENOXYLATE HYDROCHLORIDE AND ATROPINE SULFATE 2.5; .025 MG/1; MG/1
1 TABLET ORAL 4 TIMES DAILY PRN
Status: CANCELLED | OUTPATIENT
Start: 2023-07-11

## 2023-07-11 RX ORDER — METHYLERGONOVINE MALEATE 0.2 MG/ML
200 INJECTION INTRAVENOUS
Status: DISCONTINUED | OUTPATIENT
Start: 2023-07-11 | End: 2023-07-11 | Stop reason: SDUPTHER

## 2023-07-11 RX ORDER — ACETAMINOPHEN 325 MG/1
650 TABLET ORAL EVERY 6 HOURS PRN
Status: DISCONTINUED | OUTPATIENT
Start: 2023-07-11 | End: 2023-07-13 | Stop reason: HOSPADM

## 2023-07-11 RX ORDER — SODIUM CHLORIDE, SODIUM LACTATE, POTASSIUM CHLORIDE, CALCIUM CHLORIDE 600; 310; 30; 20 MG/100ML; MG/100ML; MG/100ML; MG/100ML
INJECTION, SOLUTION INTRAVENOUS CONTINUOUS
Status: DISCONTINUED | OUTPATIENT
Start: 2023-07-11 | End: 2023-07-13 | Stop reason: HOSPADM

## 2023-07-11 RX ORDER — OXYTOCIN 10 [USP'U]/ML
10 INJECTION, SOLUTION INTRAMUSCULAR; INTRAVENOUS ONCE AS NEEDED
Status: DISCONTINUED | OUTPATIENT
Start: 2023-07-11 | End: 2023-07-13 | Stop reason: HOSPADM

## 2023-07-11 RX ORDER — BUPIVACAINE HYDROCHLORIDE 2.5 MG/ML
INJECTION, SOLUTION EPIDURAL; INFILTRATION; INTRACAUDAL
Status: DISCONTINUED | OUTPATIENT
Start: 2023-07-11 | End: 2023-07-12

## 2023-07-11 RX ORDER — SODIUM CHLORIDE 0.9 % (FLUSH) 0.9 %
10 SYRINGE (ML) INJECTION
Status: DISCONTINUED | OUTPATIENT
Start: 2023-07-11 | End: 2023-07-13 | Stop reason: HOSPADM

## 2023-07-11 RX ORDER — METHYLERGONOVINE MALEATE 0.2 MG/ML
200 INJECTION INTRAVENOUS
Status: DISCONTINUED | OUTPATIENT
Start: 2023-07-11 | End: 2023-07-13 | Stop reason: HOSPADM

## 2023-07-11 RX ORDER — CARBOPROST TROMETHAMINE 250 UG/ML
250 INJECTION, SOLUTION INTRAMUSCULAR
Status: DISCONTINUED | OUTPATIENT
Start: 2023-07-11 | End: 2023-07-11 | Stop reason: SDUPTHER

## 2023-07-11 RX ORDER — OXYTOCIN/RINGER'S LACTATE 30/500 ML
334 PLASTIC BAG, INJECTION (ML) INTRAVENOUS ONCE AS NEEDED
Status: DISCONTINUED | OUTPATIENT
Start: 2023-07-11 | End: 2023-07-11 | Stop reason: SDUPTHER

## 2023-07-11 RX ORDER — MISOPROSTOL 100 UG/1
800 TABLET ORAL ONCE AS NEEDED
Status: DISCONTINUED | OUTPATIENT
Start: 2023-07-11 | End: 2023-07-13 | Stop reason: HOSPADM

## 2023-07-11 RX ORDER — SIMETHICONE 80 MG
1 TABLET,CHEWABLE ORAL 4 TIMES DAILY PRN
Status: DISCONTINUED | OUTPATIENT
Start: 2023-07-11 | End: 2023-07-11 | Stop reason: SDUPTHER

## 2023-07-11 RX ORDER — OXYTOCIN/RINGER'S LACTATE 30/500 ML
95 PLASTIC BAG, INJECTION (ML) INTRAVENOUS ONCE AS NEEDED
Status: DISCONTINUED | OUTPATIENT
Start: 2023-07-11 | End: 2023-07-11 | Stop reason: SDUPTHER

## 2023-07-11 RX ORDER — CARBOPROST TROMETHAMINE 250 UG/ML
250 INJECTION, SOLUTION INTRAMUSCULAR
Status: CANCELLED | OUTPATIENT
Start: 2023-07-11

## 2023-07-11 RX ORDER — MISOPROSTOL 100 UG/1
800 TABLET ORAL ONCE AS NEEDED
Status: DISCONTINUED | OUTPATIENT
Start: 2023-07-11 | End: 2023-07-11 | Stop reason: SDUPTHER

## 2023-07-11 RX ORDER — PROCHLORPERAZINE EDISYLATE 5 MG/ML
5 INJECTION INTRAMUSCULAR; INTRAVENOUS EVERY 6 HOURS PRN
Status: DISCONTINUED | OUTPATIENT
Start: 2023-07-11 | End: 2023-07-11 | Stop reason: SDUPTHER

## 2023-07-11 RX ORDER — EPHEDRINE SULFATE 50 MG/ML
10 INJECTION, SOLUTION INTRAVENOUS
Status: CANCELLED | OUTPATIENT
Start: 2023-07-11 | End: 2023-07-11

## 2023-07-11 RX ORDER — IBUPROFEN 600 MG/1
600 TABLET ORAL EVERY 6 HOURS
Status: DISCONTINUED | OUTPATIENT
Start: 2023-07-11 | End: 2023-07-13 | Stop reason: HOSPADM

## 2023-07-11 RX ORDER — ONDANSETRON 4 MG/1
8 TABLET, ORALLY DISINTEGRATING ORAL EVERY 8 HOURS PRN
Status: DISCONTINUED | OUTPATIENT
Start: 2023-07-11 | End: 2023-07-11 | Stop reason: SDUPTHER

## 2023-07-11 RX ORDER — OXYTOCIN/RINGER'S LACTATE 30/500 ML
95 PLASTIC BAG, INJECTION (ML) INTRAVENOUS ONCE AS NEEDED
Status: DISCONTINUED | OUTPATIENT
Start: 2023-07-11 | End: 2023-07-13 | Stop reason: HOSPADM

## 2023-07-11 RX ORDER — OXYTOCIN/RINGER'S LACTATE 30/500 ML
95 PLASTIC BAG, INJECTION (ML) INTRAVENOUS ONCE
Status: DISCONTINUED | OUTPATIENT
Start: 2023-07-11 | End: 2023-07-11 | Stop reason: SDUPTHER

## 2023-07-11 RX ORDER — ONDANSETRON 4 MG/1
8 TABLET, ORALLY DISINTEGRATING ORAL EVERY 8 HOURS PRN
Status: DISCONTINUED | OUTPATIENT
Start: 2023-07-11 | End: 2023-07-13 | Stop reason: HOSPADM

## 2023-07-11 RX ORDER — CARBOPROST TROMETHAMINE 250 UG/ML
250 INJECTION, SOLUTION INTRAMUSCULAR
Status: DISCONTINUED | OUTPATIENT
Start: 2023-07-11 | End: 2023-07-13 | Stop reason: HOSPADM

## 2023-07-11 RX ORDER — DIPHENHYDRAMINE HCL 25 MG
25 CAPSULE ORAL EVERY 4 HOURS PRN
Status: DISCONTINUED | OUTPATIENT
Start: 2023-07-11 | End: 2023-07-13 | Stop reason: HOSPADM

## 2023-07-11 RX ORDER — OXYTOCIN/RINGER'S LACTATE 30/500 ML
334 PLASTIC BAG, INJECTION (ML) INTRAVENOUS ONCE AS NEEDED
Status: DISCONTINUED | OUTPATIENT
Start: 2023-07-11 | End: 2023-07-13 | Stop reason: HOSPADM

## 2023-07-11 RX ORDER — DEXTROSE, SODIUM CHLORIDE, SODIUM LACTATE, POTASSIUM CHLORIDE, AND CALCIUM CHLORIDE 5; .6; .31; .03; .02 G/100ML; G/100ML; G/100ML; G/100ML; G/100ML
INJECTION, SOLUTION INTRAVENOUS CONTINUOUS
Status: CANCELLED | OUTPATIENT
Start: 2023-07-11

## 2023-07-11 RX ORDER — MISOPROSTOL 100 UG/1
800 TABLET ORAL ONCE AS NEEDED
Status: COMPLETED | OUTPATIENT
Start: 2023-07-11 | End: 2023-07-11

## 2023-07-11 RX ORDER — OXYTOCIN/RINGER'S LACTATE 30/500 ML
334 PLASTIC BAG, INJECTION (ML) INTRAVENOUS ONCE
Status: DISCONTINUED | OUTPATIENT
Start: 2023-07-11 | End: 2023-07-13 | Stop reason: HOSPADM

## 2023-07-11 RX ORDER — IBUPROFEN 200 MG
24 TABLET ORAL
Status: CANCELLED | OUTPATIENT
Start: 2023-07-11

## 2023-07-11 RX ORDER — CALCIUM CARBONATE 200(500)MG
500 TABLET,CHEWABLE ORAL 3 TIMES DAILY PRN
Status: DISCONTINUED | OUTPATIENT
Start: 2023-07-11 | End: 2023-07-13 | Stop reason: HOSPADM

## 2023-07-11 RX ORDER — HYDROCORTISONE 25 MG/G
CREAM TOPICAL 3 TIMES DAILY PRN
Status: DISCONTINUED | OUTPATIENT
Start: 2023-07-11 | End: 2023-07-13 | Stop reason: HOSPADM

## 2023-07-11 RX ORDER — MISOPROSTOL 100 UG/1
800 TABLET ORAL
Status: CANCELLED | OUTPATIENT
Start: 2023-07-11

## 2023-07-11 RX ORDER — DIPHENHYDRAMINE HYDROCHLORIDE 50 MG/ML
25 INJECTION INTRAMUSCULAR; INTRAVENOUS EVERY 4 HOURS PRN
Status: DISCONTINUED | OUTPATIENT
Start: 2023-07-11 | End: 2023-07-13 | Stop reason: HOSPADM

## 2023-07-11 RX ORDER — SIMETHICONE 80 MG
1 TABLET,CHEWABLE ORAL EVERY 6 HOURS PRN
Status: DISCONTINUED | OUTPATIENT
Start: 2023-07-11 | End: 2023-07-13 | Stop reason: HOSPADM

## 2023-07-11 RX ORDER — DOCUSATE SODIUM 100 MG/1
200 CAPSULE, LIQUID FILLED ORAL 2 TIMES DAILY PRN
Status: DISCONTINUED | OUTPATIENT
Start: 2023-07-11 | End: 2023-07-13 | Stop reason: HOSPADM

## 2023-07-11 RX ORDER — PROCHLORPERAZINE EDISYLATE 5 MG/ML
5 INJECTION INTRAMUSCULAR; INTRAVENOUS EVERY 6 HOURS PRN
Status: DISCONTINUED | OUTPATIENT
Start: 2023-07-11 | End: 2023-07-13 | Stop reason: HOSPADM

## 2023-07-11 RX ORDER — SODIUM CITRATE AND CITRIC ACID MONOHYDRATE 334; 500 MG/5ML; MG/5ML
30 SOLUTION ORAL ONCE
Status: CANCELLED | OUTPATIENT
Start: 2023-07-11 | End: 2023-07-11

## 2023-07-11 RX ORDER — PRENATAL WITH FERROUS FUM AND FOLIC ACID 3080; 920; 120; 400; 22; 1.84; 3; 20; 10; 1; 12; 200; 27; 25; 2 [IU]/1; [IU]/1; MG/1; [IU]/1; MG/1; MG/1; MG/1; MG/1; MG/1; MG/1; UG/1; MG/1; MG/1; MG/1; MG/1
1 TABLET ORAL DAILY
Status: DISCONTINUED | OUTPATIENT
Start: 2023-07-11 | End: 2023-07-13 | Stop reason: HOSPADM

## 2023-07-11 RX ORDER — OXYTOCIN 10 [USP'U]/ML
10 INJECTION, SOLUTION INTRAMUSCULAR; INTRAVENOUS ONCE AS NEEDED
Status: DISCONTINUED | OUTPATIENT
Start: 2023-07-11 | End: 2023-07-11 | Stop reason: SDUPTHER

## 2023-07-11 RX ORDER — FENTANYL/BUPIVACAINE/NS/PF 2-1250MCG
PLASTIC BAG, INJECTION (ML) INJECTION CONTINUOUS
Status: DISCONTINUED | OUTPATIENT
Start: 2023-07-11 | End: 2023-07-13 | Stop reason: HOSPADM

## 2023-07-11 RX ORDER — OXYCODONE AND ACETAMINOPHEN 10; 325 MG/1; MG/1
1 TABLET ORAL EVERY 4 HOURS PRN
Status: DISCONTINUED | OUTPATIENT
Start: 2023-07-11 | End: 2023-07-13 | Stop reason: HOSPADM

## 2023-07-11 RX ORDER — METHYLERGONOVINE MALEATE 0.2 MG/ML
200 INJECTION INTRAVENOUS
Status: CANCELLED | OUTPATIENT
Start: 2023-07-11

## 2023-07-11 RX ADMIN — TRANEXAMIC ACID 1000 MG: 100 INJECTION, SOLUTION INTRAVENOUS at 08:07

## 2023-07-11 RX ADMIN — IBUPROFEN 600 MG: 600 TABLET, FILM COATED ORAL at 11:07

## 2023-07-11 RX ADMIN — BENZOCAINE AND LEVOMENTHOL: 200; 5 SPRAY TOPICAL at 06:07

## 2023-07-11 RX ADMIN — BUPIVACAINE HYDROCHLORIDE 5 ML: 2.5 INJECTION, SOLUTION EPIDURAL; INFILTRATION; INTRACAUDAL; PERINEURAL at 07:07

## 2023-07-11 RX ADMIN — Medication 12 ML/HR: at 07:07

## 2023-07-11 RX ADMIN — IBUPROFEN 600 MG: 600 TABLET, FILM COATED ORAL at 06:07

## 2023-07-11 RX ADMIN — SODIUM CHLORIDE, POTASSIUM CHLORIDE, SODIUM LACTATE AND CALCIUM CHLORIDE: 600; 310; 30; 20 INJECTION, SOLUTION INTRAVENOUS at 02:07

## 2023-07-11 RX ADMIN — Medication 2 MILLI-UNITS/MIN: at 03:07

## 2023-07-11 RX ADMIN — MISOPROSTOL 800 MCG: 100 TABLET ORAL at 09:07

## 2023-07-11 RX ADMIN — Medication 95 MILLI-UNITS/MIN: at 09:07

## 2023-07-11 NOTE — H&P
[facility] - Labor and Delivery  Vaginal Delivery   Operative Note    SUMMARY     Normal spontaneous vaginal delivery of live infant, skin to skin was unable to be performed due to mom wanted baby cleaned .  Infant delivered position SILAS over intact perineum.  Nuchal cord: No.    Spontaneous delivery of placenta and IV pitocin given noting good uterine tone.  No lacerations noted. Left periurethral  Patient tolerated delivery well. Sponge needle and lap counted correctly x2.    Indications: <principal problem not specified>  Pregnancy complicated by:   Patient Active Problem List   Diagnosis    Pre-existing essential hypertension complicating pregnancy in third trimester    Insulin controlled gestational diabetes mellitus (GDM) in third trimester    H/O  delivery, currently pregnant, third trimester    Increased BMI complicating pregnancy in third trimester     Admitting GA: 38w0d    Delivery Information for Eusebio Mendenhall    Birth information:  YOB: 2024   Time of birth: 8:02 AM   Sex: female   Head Delivery Date/Time:     Delivery type:    Gestational Age: 38w0d        Delivery Providers    Delivering clinician:            Measurements    Weight:   Length:          Apgars    Living status:   Apgars:  1 min.:  5 min.:  10 min.:  15 min.:  20 min.:    Skin color:         Heart rate:         Reflex irritability:         Muscle tone:         Respiratory effort:         Total:                                  Interventions/Resuscitation           Cord    No data filed       Placenta    Placenta delivery date/time:   Placenta removal:            Labor Events:       labor: No     Labor Onset Date/Time:         Dilation Complete Date/Time: 2023 07:55     Start Pushing Date/Time: 2023 08:00       Start Pushing Date/Time: 2023 08:00     Rupture Date/Time: 23  0725         Rupture type: ARM (Artificial Rupture)         Fluid Amount:       Fluid Color:  Clear               steroids: None     Antibiotics given for GBS: No     Induction: oxytocin     Indications for induction:  Gestational Diabetic     Augmentation: oxytocin     Indications for augmentation:       Labor complications: None     Additional complications:          Cervical ripening:                     Delivery:      Episiotomy: None     Indication for Episiotomy:       Perineal Lacerations: None Repaired:      Periurethral Laceration: left Repaired: Yes   Labial Laceration:   Repaired:     Sulcus Laceration:   Repaired:     Vaginal Laceration:   Repaired:     Cervical Laceration:   Repaired:     Repair suture:       Repair # of packets: 1     Last Value - EBL - Nursing (mL):       Sum - EBL - Nursing (mL): 0     Last Value - EBL - Anesthesia (mL):      Calculated QBL (mL):       Vaginal Sweep Performed: No     Surgicount Correct: Yes     Vaginal Packing:   Quantity:       Other providers:            Details (if applicable):  Trial of Labor      Categorization:      Priority:     Indications for :     Incision Type:       Additional  information:  Forceps:    Vacuum:    Breech:    Observed anomalies    Other (Comments):

## 2023-07-11 NOTE — ANESTHESIA PROCEDURE NOTES
Epidural    Patient location during procedure: OB   Reason for block: primary anesthetic   Reason for block: labor analgesia requested by patient and obstetrician  Diagnosis: Labor pain relief   Start time: 7/11/2023 7:21 AM  Timeout: 7/11/2023 7:21 AM  End time: 7/11/2023 7:31 AM    Staffing  Performing Provider: Reggie Yoon MD  Authorizing Provider: Reggie Yoon MD        Preanesthetic Checklist  Completed: patient identified, IV checked, site marked, risks and benefits discussed, surgical consent, monitors and equipment checked, pre-op evaluation, timeout performed, anesthesia consent given, hand hygiene performed and patient being monitored  Preparation  Patient position: sitting (Mask worn, sterile gloves worn)  Prep: ChloraPrep and Pt preloaded with 500 to 1000ml of crystalloid  Patient monitoring: Pulse Ox (Pre & Post procedure vitals & FHR are recorded by the nurse Q 5mins as appropriate)  Reason for block: primary anesthetic   Epidural  Skin Anesthetic: lidocaine 1% (25G 1.5inch needle)  Skin Wheal: 5 mL  Administration type: continuous  Approach: midline  Interspace: L3-4    Injection technique: BISI saline  Needle and Epidural Catheter  Needle type: Tuohy   Needle gauge: 17  Needle length: 3.5 inches  Needle insertion depth: 5 cm  Catheter type: springwound and multi-orifice  Catheter size: 18 G  Catheter at skin depth: 15 cm  Insertion Attempts: 1  Test dose: 3 mL of lidocaine 1.5% with Epi 1-to-200,000 (Test dose given via epidural catheter)  Additional Documentation: negative aspiration for heme and CSF, no paresthesia on injection, no significant pain on injection, incremental injection, no signs/symptoms of IV or SA injection and no significant complaints from patient (Epidural catheter connected to epidural pump with filter in situ and pt instructed on its use.  Warning label placed on epidural catheter.)  Needle localization: anatomical landmarks  Assessment  Ease of block:  easy  Patient's tolerance of the procedure: comfortable throughout block (Pt returned to supine position with head of bed elevated 30 degrees and OSKAR applied as needed)  Additional Notes    AMANDA pump set to give initial 10ml bolus followed by 12ml/hr with bolus of 2ml, lockout interval 15mins, max 4 boluses/hr, Infusion conc Marcaine 0.125% + Fentanyl 2mcg/ml  Improvement in pain relief documented by L&D nurses  L&D nurses instructed to call if no relief at all after 30 mins No inadvertent dural puncture with Tuohy.  Dural puncture not performed with spinal needle

## 2023-07-11 NOTE — H&P
[facility] - Labor and Delivery  Obstetrics  History & Physical    Patient Name: Anna Mendenhall  MRN: [id number]  Admission Date: 2023  Primary Care Provider: Primary Doctor No    Subjective:     Principal Problem:GDMA2 Iol  Mfm recomeneded    History of Present Illness: a2 dm     Obstetric HPI:  Patient reports None contractions, active fetal movement, No vaginal bleeding , No loss of fluid     This pregnancy has been complicated by gdma2 hx of ptl    OB History    Para Term  AB Living   7 4 3 1 2 4   SAB IAB Ectopic Multiple Live Births   2 0 0 0 4      # Outcome Date GA Lbr Jeff/2nd Weight Sex Delivery Anes PTL Lv   7 Current            6 SAB  13w0d    SAB         Birth Comments: No D&C   5 Term 01/15/20 38w0d  2.948 kg (6 lb 8 oz) F Vag-Spont EPI  [place]   4  17 36w0d  3.062 kg (6 lb 12 oz) [place]ag-[place]ont EPI  [place]   3 Term 03/25/15   3.062 kg (6 lb 12 oz) M [place]ag-Spont EPI  [place]   2 SAB 14 8w0d    SAB         Birth Comments: D&C   1 Term 13   2.58 kg (5 lb 11 oz) F Vag-Spont EPI  [place]     Past Medical History:   Diagnosis Date    Diabetes mellitus     GESTATIONAL DM  PREGNANCY WHICH WAS INSULIN CONTROLLED    Hypertension      History reviewed. No pertinent surgical history.    PTA Medications   Medication Sig    ACCU-CHEK GUIDE ME GLUCOSE MTR Misc use as directed    ACCU-CHEK GUIDE TEST STRIPS Strp 1 strip 4 (four) times daily.    ACCU-CHEK SOFTCLIX LANCETS Misc Use 1 lancet to skin four times a day as directed    insulin NPH (HUMULIN N NPH U-100 INSULIN) 100 unit/mL injection INJECT 60 UNITS AT BEDTIME    prenatal vit 10-iron fum-folic (VITAFOL-OB) 65-1 mg Tab Take 1 tablet by mouth once daily.    aspirin 81 MG Chew Take 81 mg by mouth once daily.    ferrous sulfate 325 (65 FE) MG EC tablet Take 1 tablet (325 mg total) by mouth once daily.       Review of patient's allergies indicates:   Allergen Reactions    Amoxicillin Hives    Latex, natural  rubber      Per Dr. Crooks most recent note        Family History       Problem Relation (Age of Onset)    Cataracts Mother    Colon cancer Mother    Diabetes Mother    Hypertension Mother          Tobacco Use    Smoking status: Never     Passive exposure: Never    Smokeless tobacco: Never   Substance and Sexual Activity    Alcohol use: Never    Drug use: Never    Sexual activity: Yes     Review of Systems   Objective:     Vital Signs (Most Recent):  Temp: 99.2 °F (37.3 °C) (23 0025)  Pulse: 107 (23 0025)  BP: 125/70 (23 0025) Vital Signs (24h Range):  Temp:  [99.2 °F (37.3 °C)] 99.2 °F (37.3 °C)  Pulse:  [107] 107  BP: (122-125)/(70) 125/70        There is no height or weight on file to calculate BMI.    FHT: 150Cat 1 (reassuring)  TOCO:  Q 5-6 minutes    Physical Exam    Cervix:  Dilation:  4  Effacement:  50%  Station: -2  Presentation: Vertex     Significant Labs:  Lab Results   Component Value Date    [facility]    AFP 28.9 2023       I have personallly reviewed all pertinent lab results from the last 24 hours.    Assessment/Plan:     32 y.o. female  at 38w0d for:    There are no hospital problems to display for this patient.      Admit to lnd for iol   Pitocin  Epidural prn  Iss in labor     Gentry Tan MD  Obstetrics  [facility] - Labor and Delivery

## 2023-07-11 NOTE — ANESTHESIA PREPROCEDURE EVALUATION
07/11/2023  Anna Mendenhall is a 32 y.o., female.      Pre-op Assessment    I have reviewed the Patient Summary Reports.     I have reviewed the Nursing Notes. I have reviewed the NPO Status.   I have reviewed the Medications.     Review of Systems      Physical Exam  General: Well nourished and Cooperative    Airway:  Mallampati: II   Mouth Opening: Normal  TM Distance: Normal  Tongue: Normal  Neck ROM: Normal ROM    Chest/Lungs:  Clear to auscultation    Heart:  Rate: Normal        Anesthesia Plan  Type of Anesthesia, risks & benefits discussed:    Anesthesia Type: Epidural  Intra-op Monitoring Plan: Standard ASA Monitors  Post Op Pain Control Plan: multimodal analgesia  Informed Consent: Informed consent signed with the Patient and all parties understand the risks and agree with anesthesia plan.  All questions answered.   ASA Score: 2  Day of Surgery Review of History & Physical: H&P Update referred to the surgeon/provider.    Ready For Surgery From Anesthesia Perspective.     .  I explained anesthesia plan to patient/responsbile party if available.  Anesthesia consent done going over the material facts, risks, complications & alternatives, obtained which includes the possibility of altering the anesthesia plan.  I reviewed problem list, appropriate labs, any workup, Xray, EKG etc noted below.  Patients condition is satisfactory to proceed with anesthesia plan unless otherwise noted (see anesthesia chart for details of the anesthesia plan carried out).      Pre-operative evaluation for * No surgery found *    BP (!) 109/58   Pulse 81   Temp 36.9 °C (98.4 °F)   Resp 18   LMP 10/18/2022   Breastfeeding No     Patient Active Problem List   Diagnosis    Pre-existing essential hypertension complicating pregnancy in third trimester    Insulin controlled gestational diabetes mellitus (GDM) in third  trimester    H/O  delivery, currently pregnant, third trimester    Increased BMI complicating pregnancy in third trimester       Review of patient's allergies indicates:   Allergen Reactions    Amoxicillin Hives       Current Outpatient Medications   Medication Instructions    ACCU-CHEK GUIDE ME GLUCOSE MTR Misc use as directed    ACCU-CHEK GUIDE TEST STRIPS Strp 1 strip, 4 times daily    ACCU-CHEK SOFTCLIX LANCETS Misc Use 1 lancet to skin four times a day as directed    aspirin 81 mg, Oral, Daily    ferrous sulfate 325 mg, Oral, Daily    insulin NPH (HUMULIN N NPH U-100 INSULIN) 100 unit/mL injection INJECT 60 UNITS AT BEDTIME    prenatal vit 10-iron fum-folic (VITAFOL-OB) 65-1 mg Tab 1 tablet, Oral, Daily       History reviewed. No pertinent surgical history.    Social History     Socioeconomic History    Marital status:    Tobacco Use    Smoking status: Never     Passive exposure: Never    Smokeless tobacco: Never   Substance and Sexual Activity    Alcohol use: Never    Drug use: Never    Sexual activity: Yes       Lab Results   Component Value Date    WBC 7.46 2023    HGB 11.1 (L) 2023    HCT 34.0 (L) 2023    MCV 79.4 (L) 2023     2023          BMP  Lab Results   Component Value Date    HCT 34.0 (L) 2023     2022    K 3.8 2022    BUN 7.2 2022    CREATININE 0.66 03/10/2023    CALCIUM 9.2 2022        INR  No results for input(s): PT, INR, PROTIME, APTT in the last 72 hours.        Diagnostic Studies:      EKG:  No results found for this or any previous visit.

## 2023-07-12 PROBLEM — Z34.90 ENCOUNTER FOR ELECTIVE INDUCTION OF LABOR: Status: ACTIVE | Noted: 2023-07-12

## 2023-07-12 PROCEDURE — 11000001 HC ACUTE MED/SURG PRIVATE ROOM

## 2023-07-12 PROCEDURE — 25000003 PHARM REV CODE 250: Performed by: OBSTETRICS & GYNECOLOGY

## 2023-07-12 PROCEDURE — 25000003 PHARM REV CODE 250: Performed by: ANESTHESIOLOGY

## 2023-07-12 RX ORDER — OXYCODONE AND ACETAMINOPHEN 5; 325 MG/1; MG/1
1 TABLET ORAL EVERY 6 HOURS PRN
Qty: 12 TABLET | Refills: 0 | Status: SHIPPED | OUTPATIENT
Start: 2023-07-12

## 2023-07-12 RX ADMIN — IBUPROFEN 600 MG: 600 TABLET, FILM COATED ORAL at 01:07

## 2023-07-12 RX ADMIN — IBUPROFEN 600 MG: 600 TABLET, FILM COATED ORAL at 07:07

## 2023-07-12 RX ADMIN — PRENATAL VITAMINS-IRON FUMARATE 27 MG IRON-FOLIC ACID 0.8 MG TABLET 1 TABLET: at 08:07

## 2023-07-12 RX ADMIN — IBUPROFEN 600 MG: 600 TABLET, FILM COATED ORAL at 08:07

## 2023-07-12 RX ADMIN — DOCUSATE SODIUM 200 MG: 100 CAPSULE, LIQUID FILLED ORAL at 01:07

## 2023-07-12 NOTE — DISCHARGE SUMMARY
[facility] - 3rd Floor Mother/Baby Unit  Obstetrics  Discharge Summary      Patient Name: Anna Mendenhall  MRN: [id number]  Admission Date: 7/11/2023  Hospital Length of Stay: 1 days  Discharge Date and Time:  07/12/2023 7:35 AM  Attending Physician: Gentry Tan MD   Discharging Provider: Gentry Tan MD   Primary Care Provider: Primary Doctor No    HPI: No notes on file        * No surgery found *     Hospital Course:   No notes on file     Consults (From admission, onward)          Status Ordering Provider     Inpatient consult to Anesthesiology  Once        Provider:  (Not yet assigned)    Acknowledged GENTRY TAN            Final Active Diagnoses:    Diagnosis Date Noted POA    PRINCIPAL PROBLEM:  Encounter for elective induction of labor [Z34.90] 07/12/2023 Not Applicable      Problems Resolved During this Admission:        Significant Diagnostic Studies: Labs: All labs within the past 24 hours have been reviewed      Feeding Method: both breast and bottle    Immunizations       Date Immunization Status Dose Route/Site Given by    07/11/23 0910 MMR Incomplete 0.5 mL Subcutaneous/     07/11/23 0910 Tdap Incomplete 0.5 mL Intramuscular/             Delivery:    Episiotomy: None   Lacerations: None   Repair suture:     Repair # of packets: 1   Blood loss (ml):       Birth information:  YOB: 2024   Time of birth: 8:02 AM   Sex: female   Delivery type: Vaginal, Spontaneous   Gestational Age: 38w0d    Delivery Clinician:      Other providers:       Additional  information:  Forceps:    Vacuum:    Breech:    Observed anomalies      Living?:           APGARS  One minute Five minutes Ten minutes   Skin color:         Heart rate:         Grimace:         Muscle tone:         Breathing:         Totals: 8  9        Placenta: Delivered:       appearance  Pending Diagnostic Studies:       None            Discharged Condition: stable    Disposition: Home or Self Care    Follow  Up:    Patient Instructions:   No discharge procedures on file.  Medications:  Current Discharge Medication List        START taking these medications    Details   oxyCODONE-acetaminophen (PERCOCET) 5-325 mg per tablet Take 1 tablet by mouth every 6 (six) hours as needed for Pain.  Qty: 12 tablet, Refills: 0    Comments: Quantity prescribed more than 7 day supply? Yes, quantity medically necessary           CONTINUE these medications which have NOT CHANGED    Details   ACCU-CHEK GUIDE ME GLUCOSE MTR Misc use as directed      ACCU-CHEK GUIDE TEST STRIPS Strp 1 strip 4 (four) times daily.      ACCU-CHEK SOFTCLIX LANCETS Misc Use 1 lancet to skin four times a day as directed      insulin NPH (HUMULIN N NPH U-100 INSULIN) 100 unit/mL injection INJECT 60 UNITS AT BEDTIME  Qty: 10 mL, Refills: 3    Associated Diagnoses: Insulin controlled gestational diabetes mellitus (GDM) in third trimester      prenatal vit 10-iron fum-folic (VITAFOL-OB) 65-1 mg Tab Take 1 tablet by mouth once daily.      aspirin 81 MG Chew Take 81 mg by mouth once daily.      ferrous sulfate 325 (65 FE) MG EC tablet Take 1 tablet (325 mg total) by mouth once daily.  Qty: 30 tablet, Refills: 3    Associated Diagnoses: Anemia, unspecified type             Gentry Tan MD  Obstetrics  [facility] - 3rd Floor Mother/Baby Unit

## 2023-07-12 NOTE — PLAN OF CARE
"  Problem: Adult Inpatient Plan of Care  Goal: Plan of Care Review  Outcome: Ongoing, Progressing  Goal: Patient-Specific Goal (Individualized)  Description: "I want to shower"  Outcome: Ongoing, Progressing  Goal: Absence of Hospital-Acquired Illness or Injury  Outcome: Ongoing, Progressing  Goal: Optimal Comfort and Wellbeing  Outcome: Ongoing, Progressing  Goal: Readiness for Transition of Care  Outcome: Ongoing, Progressing     Problem: Diabetes in Pregnancy  Goal: Blood Glucose Level Within Targeted Range  Outcome: Ongoing, Progressing     Problem:  Fall Injury Risk  Goal: Absence of Fall, Infant Drop and Related Injury  Outcome: Ongoing, Progressing     Problem: Infection  Goal: Absence of Infection Signs and Symptoms  Outcome: Ongoing, Progressing     Problem: Bleeding (Labor)  Goal: Hemostasis  Outcome: Ongoing, Progressing     Problem: Change in Fetal Wellbeing (Labor)  Goal: Stable Fetal Wellbeing  Outcome: Ongoing, Progressing     Problem: Delayed Labor Progression (Labor)  Goal: Effective Progression to Delivery  Outcome: Ongoing, Progressing     Problem: Infection (Labor)  Goal: Absence of Infection Signs and Symptoms  Outcome: Ongoing, Progressing     Problem: Labor Pain (Labor)  Goal: Acceptable Pain Control  Outcome: Ongoing, Progressing     Problem: Uterine Tachysystole (Labor)  Goal: Normal Uterine Contraction Pattern  Outcome: Ongoing, Progressing     Problem: Adjustment to Role Transition (Postpartum Vaginal Delivery)  Goal: Successful Maternal Role Transition  Outcome: Ongoing, Progressing     Problem: Bleeding (Postpartum Vaginal Delivery)  Goal: Hemostasis  Outcome: Ongoing, Progressing     Problem: Infection (Postpartum Vaginal Delivery)  Goal: Absence of Infection Signs/Symptoms  Outcome: Ongoing, Progressing     Problem: Pain (Postpartum Vaginal Delivery)  Goal: Acceptable Pain Control  Outcome: Ongoing, Progressing     Problem: Urinary Retention (Postpartum Vaginal " Delivery)  Goal: Effective Urinary Elimination  Outcome: Ongoing, Progressing

## 2023-07-12 NOTE — ANESTHESIA POSTPROCEDURE EVALUATION
Anesthesia Post Evaluation    Patient: Anna Mendenhall    Procedure(s) Performed: * No procedures listed *    Final Anesthesia Type: epidural      Patient location during evaluation: PACU  Patient participation: Yes- Able to Participate  Level of consciousness: awake and alert  Post-procedure vital signs: reviewed and stable  Pain management: adequate  Airway patency: patent  JEANA mitigation strategies: Use of major conduction anesthesia (spinal/epidural) or peripheral nerve block  PONV status at discharge: No PONV  Anesthetic complications: no      Cardiovascular status: hemodynamically stable  Respiratory status: unassisted, spontaneous ventilation and room air  Hydration status: euvolemic  Follow-up not needed.  Comments: Denies problem c spinal or epidural anesthesia           Vitals Value Taken Time   /77 07/12/23 0736   Temp 36.8 °C (98.2 °F) 07/12/23 0736   Pulse 79 07/12/23 0736   Resp 17 07/12/23 0736   SpO2 100 % 07/11/23 0811   Vitals shown include unvalidated device data.      No case tracking events are documented in the log.      Pain/Gypsy Score: Pain Rating Prior to Med Admin: 0 (7/12/2023  8:06 AM)

## 2023-07-13 VITALS
HEART RATE: 78 BPM | OXYGEN SATURATION: 100 % | TEMPERATURE: 98 F | DIASTOLIC BLOOD PRESSURE: 77 MMHG | RESPIRATION RATE: 18 BRPM | SYSTOLIC BLOOD PRESSURE: 116 MMHG

## 2023-07-13 PROCEDURE — 25000003 PHARM REV CODE 250: Performed by: OBSTETRICS & GYNECOLOGY

## 2023-07-13 PROCEDURE — 25000003 PHARM REV CODE 250: Performed by: ANESTHESIOLOGY

## 2023-07-13 RX ADMIN — IBUPROFEN 600 MG: 600 TABLET, FILM COATED ORAL at 01:07

## 2023-07-13 RX ADMIN — PRENATAL VITAMINS-IRON FUMARATE 27 MG IRON-FOLIC ACID 0.8 MG TABLET 1 TABLET: at 08:07

## 2023-07-13 RX ADMIN — DOCUSATE SODIUM 200 MG: 100 CAPSULE, LIQUID FILLED ORAL at 08:07

## 2023-07-13 RX ADMIN — IBUPROFEN 600 MG: 600 TABLET, FILM COATED ORAL at 08:07

## 2023-07-13 NOTE — PLAN OF CARE
"  Problem: Adult Inpatient Plan of Care  Goal: Plan of Care Review  Outcome: Met  Goal: Patient-Specific Goal (Individualized)  Description: "I want to shower"  Outcome: Met  Goal: Absence of Hospital-Acquired Illness or Injury  Outcome: Met  Goal: Optimal Comfort and Wellbeing  Outcome: Met  Goal: Readiness for Transition of Care  Outcome: Met     Problem: Diabetes in Pregnancy  Goal: Blood Glucose Level Within Targeted Range  Outcome: Met     Problem:  Fall Injury Risk  Goal: Absence of Fall, Infant Drop and Related Injury  Outcome: Met     Problem: Infection  Goal: Absence of Infection Signs and Symptoms  Outcome: Met     Problem: Bleeding (Labor)  Goal: Hemostasis  Outcome: Met     Problem: Change in Fetal Wellbeing (Labor)  Goal: Stable Fetal Wellbeing  Outcome: Met     Problem: Delayed Labor Progression (Labor)  Goal: Effective Progression to Delivery  Outcome: Met     Problem: Infection (Labor)  Goal: Absence of Infection Signs and Symptoms  Outcome: Met     Problem: Labor Pain (Labor)  Goal: Acceptable Pain Control  Outcome: Met     Problem: Uterine Tachysystole (Labor)  Goal: Normal Uterine Contraction Pattern  Outcome: Met     Problem: Adjustment to Role Transition (Postpartum Vaginal Delivery)  Goal: Successful Maternal Role Transition  Outcome: Met     Problem: Bleeding (Postpartum Vaginal Delivery)  Goal: Hemostasis  Outcome: Met     Problem: Infection (Postpartum Vaginal Delivery)  Goal: Absence of Infection Signs/Symptoms  Outcome: Met     Problem: Pain (Postpartum Vaginal Delivery)  Goal: Acceptable Pain Control  Outcome: Met     Problem: Urinary Retention (Postpartum Vaginal Delivery)  Goal: Effective Urinary Elimination  Outcome: Met     "

## 2023-07-16 NOTE — L&D DELIVERY NOTE
"[facility] - 3rd Floor Mother/Baby Unit  Vaginal Delivery   Operative Note    SUMMARY     Normal spontaneous vaginal delivery of live infant, was placed on mothers abdomen for skin to skin and bulb suctioning performed.  Infant delivered position SILAS over intact perineum.  Nuchal cord: No.    Spontaneous delivery of placenta and IV pitocin given noting good uterine tone.  No lacerations noted.  Patient tolerated delivery well. Sponge needle and lap counted correctly x2.    Indications: Encounter for elective induction of labor  Pregnancy complicated by:   Patient Active Problem List   Diagnosis    Pre-existing essential hypertension complicating pregnancy in third trimester    Insulin controlled gestational diabetes mellitus (GDM) in third trimester    H/O  delivery, currently pregnant, third trimester    Increased BMI complicating pregnancy in third trimester    Encounter for elective induction of labor     Admitting GA: 38w0d    Delivery Information for Yonatan Dorsey    Birth information:  YOB: 2024   Time of birth: 8:02 AM   Sex: female   Head Delivery Date/Time: 2023  8:02 AM   Delivery type: Vaginal, Spontaneous   Gestational Age: 38w0d        Delivery Providers    Delivering clinician: Gentry Tan MD   Provider Role    Robert Deluna RN Registered Nurse    Verónica Comer RN Registered Nurse    Edie Marx RN Registered Nurse    Chika Villar RN Registered Nurse              Measurements    Weight: 3033 g  Weight (lbs): 6 lb 11 oz  Length: 50.8 cm  Length (in): 20"  Head circumference: 33 cm         Apgars    Living status: Living  Apgars:  1 min.:  5 min.:  10 min.:  15 min.:  20 min.:    Skin color:  0  1       Heart rate:  2  2       Reflex irritability:  2  2       Muscle tone:  2  2       Respiratory effort:  2  2       Total:  8  9       Apgars assigned by: MARISABEL VILLAR RN         Operative Delivery    Forceps attempted?: No  Vacuum extractor " attempted?: No         Shoulder Dystocia    Shoulder dystocia present?: No           Presentation    Presentation: Vertex  Position: Occiput Anterior           Interventions/Resuscitation    Method: Tactile Stimulation, Deep Suctioning       Cord    Vessels: 3 vessels  Complications: None  Delayed Cord Clamping?: No  Cord Clamped Date/Time: 2023  8:03 AM  Cord Blood Disposition: Sent with Baby  Gases Sent?: No  Stem Cell Collection (by MD): No       Placenta    Placenta delivery date/time: 2023 0850  Placenta removal: Spontaneous  Placenta appearance: Intact  Placenta disposition: Discarded           Labor Events:       labor: No     Labor Onset Date/Time: 2023 03:00     Dilation Complete Date/Time: 2023 07:55     Start Pushing Date/Time: 2023 08:00       Start Pushing Date/Time: 2023 08:00     Rupture Date/Time: 23  0725         Rupture type: ARM (Artificial Rupture)         Fluid Amount:       Fluid Color: Clear               steroids: None     Antibiotics given for GBS: No     Induction: oxytocin     Indications for induction:  Gestational Diabetic     Augmentation: amniotomy     Indications for augmentation:       Labor complications: None     Additional complications:          Cervical ripening:                     Delivery:      Episiotomy: None     Indication for Episiotomy:       Perineal Lacerations: None Repaired:      Periurethral Laceration: left Repaired: Yes   Labial Laceration:   Repaired:     Sulcus Laceration:   Repaired:     Vaginal Laceration:   Repaired:     Cervical Laceration:   Repaired:     Repair suture:       Repair # of packets: 1     Last Value - EBL - Nursing (mL):       Sum - EBL - Nursing (mL): 0     Last Value - EBL - Anesthesia (mL):      Calculated QBL (mL):       Vaginal Sweep Performed: No     Surgicount Correct: Yes       Other providers:       Anesthesia    Method: Epidural          Details (if applicable):  Trial  of Labor      Categorization:      Priority:     Indications for :     Incision Type:       Additional  information:  Forceps:    Vacuum:    Breech:    Observed anomalies    Other (Comments):